# Patient Record
Sex: FEMALE | Race: WHITE | NOT HISPANIC OR LATINO | Employment: FULL TIME | URBAN - METROPOLITAN AREA
[De-identification: names, ages, dates, MRNs, and addresses within clinical notes are randomized per-mention and may not be internally consistent; named-entity substitution may affect disease eponyms.]

---

## 2019-07-10 ENCOUNTER — HOSPITAL ENCOUNTER (EMERGENCY)
Facility: HOSPITAL | Age: 44
Discharge: HOME | End: 2019-07-10
Attending: EMERGENCY MEDICINE

## 2019-07-10 VITALS
BODY MASS INDEX: 31.01 KG/M2 | WEIGHT: 175 LBS | HEART RATE: 73 BPM | HEIGHT: 63 IN | OXYGEN SATURATION: 99 % | TEMPERATURE: 98 F | RESPIRATION RATE: 14 BRPM

## 2019-07-10 DIAGNOSIS — M25.461 KNEE EFFUSION, RIGHT: Primary | ICD-10-CM

## 2019-07-10 DIAGNOSIS — R52 PAIN: ICD-10-CM

## 2019-07-10 PROCEDURE — 25000003 PHARM REV CODE 250: Performed by: NURSE PRACTITIONER

## 2019-07-10 PROCEDURE — 99284 EMERGENCY DEPT VISIT MOD MDM: CPT | Mod: 25

## 2019-07-10 PROCEDURE — 29505 APPLICATION LONG LEG SPLINT: CPT | Mod: RT

## 2019-07-10 RX ORDER — SULFAMETHOXAZOLE AND TRIMETHOPRIM 800; 160 MG/1; MG/1
1 TABLET ORAL
Status: COMPLETED | OUTPATIENT
Start: 2019-07-10 | End: 2019-07-10

## 2019-07-10 RX ORDER — LEVOTHYROXINE SODIUM 75 UG/1
75 TABLET ORAL DAILY
COMMUNITY
End: 2021-10-07

## 2019-07-10 RX ORDER — RIFAMPIN 300 MG/1
300 CAPSULE ORAL EVERY 12 HOURS
Qty: 14 CAPSULE | Refills: 0 | Status: SHIPPED | OUTPATIENT
Start: 2019-07-10 | End: 2019-07-17

## 2019-07-10 RX ORDER — SULFAMETHOXAZOLE AND TRIMETHOPRIM 800; 160 MG/1; MG/1
1 TABLET ORAL 2 TIMES DAILY
Qty: 14 TABLET | Refills: 0 | Status: SHIPPED | OUTPATIENT
Start: 2019-07-10 | End: 2019-07-17

## 2019-07-10 RX ORDER — NAPROXEN 500 MG/1
500 TABLET ORAL 2 TIMES DAILY WITH MEALS
Qty: 60 TABLET | Refills: 0 | Status: SHIPPED | OUTPATIENT
Start: 2019-07-10 | End: 2021-10-07

## 2019-07-10 RX ORDER — RIFAMPIN 300 MG/1
300 CAPSULE ORAL
Status: COMPLETED | OUTPATIENT
Start: 2019-07-10 | End: 2019-07-10

## 2019-07-10 RX ADMIN — SULFAMETHOXAZOLE AND TRIMETHOPRIM 1 TABLET: 800; 160 TABLET ORAL at 05:07

## 2019-07-10 RX ADMIN — RIFAMPIN 300 MG: 300 CAPSULE ORAL at 05:07

## 2019-07-10 NOTE — ED PROVIDER NOTES
Encounter Date: 7/10/2019    SCRIBE #1 NOTE: Mariann COCHRAN, am scribing for, and in the presence of, BHAVIK Fields.       History     Chief Complaint   Patient presents with    Knee Pain     R knee pain since  with edema, painful weight bearing.       Time seen by provider: 3:18 PM on 07/10/2019    Marcelina Deluca is a 43 y.o. female who presents the ED with complaints of right knee pain since x3 days ago. The patient reports swimming in [place] x3 days ago and assumed that the redness and pain was from a sunburn. She reports the pain has been worsening and she has been able to bear weight with pain. She denies any recent injury to that knee. She denies having a fever. The patient denies onset of any other symptoms at this time. PMHx of thyroid disease. SHx includes cholecystectomy and  section. NKDA noted.    The history is provided by the patient.     Review of patient's allergies indicates:  No Known Allergies  Past Medical History:   Diagnosis Date    Thyroid disease      History reviewed. No pertinent surgical history.  History reviewed. No pertinent family history.  Social History     Tobacco Use    Smoking status: Current Every Day Smoker     Types: Cigarettes   Substance Use Topics    Alcohol use: Not on file    Drug use: Not on file     Review of Systems   Constitutional: Negative for activity change, appetite change, chills, fatigue and fever.   Eyes: Negative for visual disturbance.   Respiratory: Negative for apnea and shortness of breath.    Cardiovascular: Negative for chest pain and palpitations.   Gastrointestinal: Negative for abdominal distention and abdominal pain.   Genitourinary: Negative for difficulty urinating.   Musculoskeletal: Positive for arthralgias (right knee). Negative for neck pain.   Skin: Negative for pallor and rash.   Neurological: Negative for headaches.   Hematological: Does not bruise/bleed easily.   Psychiatric/Behavioral: Negative for agitation.        Physical Exam     Initial Vitals [07/10/19 1504]   BP Pulse Resp Temp SpO2   -- 73 14 98.4 °F (36.9 °C) 99 %      MAP       --         Physical Exam    Nursing note and vitals reviewed.  Constitutional: Vital signs are normal. She appears well-developed and well-nourished.   HENT:   Head: Normocephalic and atraumatic.   Eyes: Pupils are equal, round, and reactive to light.   Neck: Neck supple.   Cardiovascular: Normal rate, regular rhythm, normal heart sounds and intact distal pulses. Exam reveals no gallop and no friction rub.    No murmur heard.  Pulses:       Dorsalis pedis pulses are 2+ on the right side.   2+ pedal pulse to lower right extremity.   Pulmonary/Chest: Breath sounds normal. She has no wheezes. She has no rhonchi. She has no rales.   Abdominal: Soft. Normal appearance and bowel sounds are normal. There is no tenderness.   Musculoskeletal:        Right knee: She exhibits swelling. She exhibits normal range of motion, no effusion, no ecchymosis, no deformity, no laceration, no erythema, normal alignment, no LCL laxity, normal patellar mobility and no bony tenderness. Tenderness found.        Legs:  Swelling and tenderness medially and superior to right knee.  Normal ROM of the right knee.  No patella tenderness. No palpable mass or effusion. No overlying erythema or warmth.    Neurological: She is alert and oriented to person, place, and time. She has normal strength.   Skin: Skin is warm, dry and intact.   Psychiatric: She has a normal mood and affect. Her speech is normal and behavior is normal.         ED Course   Procedures  Labs Reviewed - No data to display       Imaging Results          X-Ray Knee 3 View Right (Final result)  Result time 07/10/19 15:41:49    Final result by Debbie Forte MD (07/10/19 15:41:49)                 Impression:      As above      Electronically signed by: Debbie Forte MD  Date:    07/10/2019  Time:    15:41             Narrative:    EXAMINATION:  XR KNEE  3 VIEW RIGHT    TECHNIQUE:  AP, lateral, and Merchant views of the right knee were performed.    COMPARISON:  None    FINDINGS:  No acute fracture or dislocation right knee.  Medial compartment however appears slightly widened and recommend correlation clinically if there is clinical consideration for ligamentous injury..  No joint effusion.                                 Medical Decision Making:   History:   Old Medical Records: I decided to obtain old medical records.  Differential Diagnosis:   Knee strain  Bursitis  Abscess    Clinical Tests:   Radiological Study: Reviewed and Ordered       APC / Resident Notes:   Patient is a 43 y.o. female who presents to the ED 07/10/2019 who underwent emergent evaluation for right knee pain for a few days he has.  Patient denies any injury or trauma.  She is able to bear weight on the right knee.  She has no pain out of proportion.  She has normal range of motion of the right knee.  I do not think septic joint.  There is no overlying warmth or erythema or signs of infection.  Patient is afebrile with normal vital signs and well-appearing in the emergency department.  She has +2 distal pulses to the right lower extremity with no signs of neurovascular compromise.  She has tenderness medially and superior to the right knee.  X-ray of right knee without acute findings.  Do not think acute fracture location.  I have low suspicion for acute ligament or tendon injury. There is no increased laxity of the joint.  There is no palpable mass or abscess or induration.  Ultrasound shows small fluid collection is unclear whether this is an effusion or if this is something forming lateral to the joint.  Discussed these findings with patient and possibility of joint effusion an versus proximal abscess forming.  Patient is placed on antibiotics out of an abundance of precaution.  Discussed possible MRI to further evaluate however I do not believe this is emergent given how well-appearing  patient is and physical exam.  Patient is agreeable to deferment as she is unsure of her current insurance status and would prefer to follow up outpatient in a few days.  I believe this is reasonable and she is given referral to the orthopedic.  She is instructed to return to emergency department for symptoms. She verbalized understanding.  Patient is also given a knee immobilizer and crutches as well as anti-inflammatories for her pain. Based on my clinical evaluation, I do not appreciate any immediate, emergent, or life threatening condition or etiology that warrants additional workup today and feel that the patient can be discharged with close follow up care. Case discussed with Dr. Finch who also evaluated patient and who is agreeable to plan of care. Follow up and return precautions discussed; patient verbalized understanding and is agreeable to plan of care. Patient discharged home in stable condition.             Scribe Attestation:   Scribe #1: I performed the above scribed service and the documentation accurately describes the services I performed. I attest to the accuracy of the note.    Attending Attestation:           Physician Attestation for Scribe:  Physician Attestation Statement for Scribe #1: I, Samantha Davison, reviewed documentation, as scribed by in my presence, and it is both accurate and complete.     Comments: I, BHAVIK Fields, personally performed the services described in this documentation. All medical record entries made by the scribe were at my direction and in my presence.  I have reviewed the chart and agree that the record reflects my personal performance and is accurate and complete. BHAVIK Fields.  5:07 PM 07/10/2019 e              Patient is a 43-year-old female with complaint of right knee pain present for the last 3 days.  Patient was recently out of town and has a sunburn to both legs.  Patient denies any history of injury. Patient has increased pain with  weight-bearing.  No fevers.  Patient is a very benign exam stable vital signs.  The knee has full range of motion. No laxity deformity.  Patient is neurovascularly intact.  There is no cellulitis or abscess appreciated.  No soft tissue swelling. X-rays of the right knee show no acute fracture.  We will obtain an ultrasound will reassess.  Patient's x-rays are fairly unremarkable.  Ultrasound shows a small fluid collection on the medial aspect of the knee unsure if this is connected to the joint space.  Patient appear toxic or septic.  No findings to suggest a septic joint.  Patient placed in an knee immobilizer on anti-inflammatories as well as antibiotics with close follow-up with Orthopedics.  Clinical Impression:       ICD-10-CM ICD-9-CM   1. Knee effusion, right M25.461 719.06   2. Pain R52 780.96         Disposition:   Disposition: Discharged  Condition: Stable                        Samantha Davison NP  07/10/19 1701       Edmund Rahman MD  07/10/19 3835

## 2021-09-22 ENCOUNTER — TELEPHONE (OUTPATIENT)
Dept: DERMATOLOGY | Facility: CLINIC | Age: 46
End: 2021-09-22

## 2021-10-07 ENCOUNTER — OFFICE VISIT (OUTPATIENT)
Dept: DERMATOLOGY | Facility: CLINIC | Age: 46
End: 2021-10-07
Payer: COMMERCIAL

## 2021-10-07 ENCOUNTER — TELEPHONE (OUTPATIENT)
Dept: DERMATOLOGY | Facility: CLINIC | Age: 46
End: 2021-10-07

## 2021-10-07 DIAGNOSIS — L23.9 ALLERGIC CONTACT DERMATITIS, UNSPECIFIED TRIGGER: Primary | ICD-10-CM

## 2021-10-07 DIAGNOSIS — H02.60 XANTHELASMA: ICD-10-CM

## 2021-10-07 DIAGNOSIS — L81.4 SOLAR LENTIGO: ICD-10-CM

## 2021-10-07 DIAGNOSIS — L82.1 SEBORRHEIC KERATOSES: ICD-10-CM

## 2021-10-07 DIAGNOSIS — D22.9 MULTIPLE BENIGN NEVI: ICD-10-CM

## 2021-10-07 PROCEDURE — 1160F PR REVIEW ALL MEDS BY PRESCRIBER/CLIN PHARMACIST DOCUMENTED: ICD-10-PCS | Mod: CPTII,S$GLB,, | Performed by: DERMATOLOGY

## 2021-10-07 PROCEDURE — 1159F PR MEDICATION LIST DOCUMENTED IN MEDICAL RECORD: ICD-10-PCS | Mod: CPTII,S$GLB,, | Performed by: DERMATOLOGY

## 2021-10-07 PROCEDURE — 1159F MED LIST DOCD IN RCRD: CPT | Mod: CPTII,S$GLB,, | Performed by: DERMATOLOGY

## 2021-10-07 PROCEDURE — 99204 OFFICE O/P NEW MOD 45 MIN: CPT | Mod: S$GLB,,, | Performed by: DERMATOLOGY

## 2021-10-07 PROCEDURE — 99999 PR PBB SHADOW E&M-EST. PATIENT-LVL II: CPT | Mod: PBBFAC,,, | Performed by: DERMATOLOGY

## 2021-10-07 PROCEDURE — 99204 PR OFFICE/OUTPT VISIT, NEW, LEVL IV, 45-59 MIN: ICD-10-PCS | Mod: S$GLB,,, | Performed by: DERMATOLOGY

## 2021-10-07 PROCEDURE — 99999 PR PBB SHADOW E&M-EST. PATIENT-LVL II: ICD-10-PCS | Mod: PBBFAC,,, | Performed by: DERMATOLOGY

## 2021-10-07 PROCEDURE — 1160F RVW MEDS BY RX/DR IN RCRD: CPT | Mod: CPTII,S$GLB,, | Performed by: DERMATOLOGY

## 2021-10-07 RX ORDER — DESOXIMETASONE 0.5 MG/G
CREAM TOPICAL
Qty: 100 G | Refills: 2 | Status: SHIPPED | OUTPATIENT
Start: 2021-10-07

## 2021-10-07 RX ORDER — LEVOTHYROXINE SODIUM 100 UG/1
1 TABLET ORAL EVERY MORNING
COMMUNITY
Start: 2020-11-09

## 2021-10-07 RX ORDER — TRIAMCINOLONE ACETONIDE 40 MG/ML
40 INJECTION, SUSPENSION INTRA-ARTICULAR; INTRAMUSCULAR
Status: SHIPPED | OUTPATIENT
Start: 2021-10-07

## 2021-10-07 RX ORDER — TRIAMCINOLONE ACETONIDE 1 MG/G
CREAM TOPICAL 2 TIMES DAILY
COMMUNITY
Start: 2021-09-08 | End: 2021-10-07

## 2021-10-08 RX ORDER — MOMETASONE FUROATE 1 MG/G
OINTMENT TOPICAL
Qty: 45 G | Refills: 1 | Status: SHIPPED | OUTPATIENT
Start: 2021-10-08

## 2024-06-11 ENCOUNTER — OFFICE VISIT (OUTPATIENT)
Dept: URGENT CARE | Facility: CLINIC | Age: 49
End: 2024-06-11
Payer: COMMERCIAL

## 2024-06-11 VITALS
SYSTOLIC BLOOD PRESSURE: 142 MMHG | DIASTOLIC BLOOD PRESSURE: 98 MMHG | TEMPERATURE: 98 F | BODY MASS INDEX: 31.01 KG/M2 | OXYGEN SATURATION: 98 % | WEIGHT: 175 LBS | HEART RATE: 79 BPM | HEIGHT: 63 IN

## 2024-06-11 DIAGNOSIS — L72.9 INFECTED CYST OF SKIN: Primary | ICD-10-CM

## 2024-06-11 DIAGNOSIS — L08.9 INFECTED CYST OF SKIN: Primary | ICD-10-CM

## 2024-06-11 PROCEDURE — 99204 OFFICE O/P NEW MOD 45 MIN: CPT | Mod: S$GLB,,, | Performed by: NURSE PRACTITIONER

## 2024-06-11 RX ORDER — MUPIROCIN 20 MG/G
OINTMENT TOPICAL 2 TIMES DAILY
Qty: 22 G | Refills: 0 | Status: SHIPPED | OUTPATIENT
Start: 2024-06-11

## 2024-06-11 RX ORDER — HYDROCHLOROTHIAZIDE 12.5 MG/1
12.5 CAPSULE ORAL
COMMUNITY

## 2024-06-11 RX ORDER — SULFAMETHOXAZOLE AND TRIMETHOPRIM 800; 160 MG/1; MG/1
1 TABLET ORAL 2 TIMES DAILY
Qty: 14 TABLET | Refills: 0 | Status: SHIPPED | OUTPATIENT
Start: 2024-06-11 | End: 2024-06-18

## 2024-06-11 NOTE — PROCEDURES
"Incision & Drainage    Date/Time: 6/11/2024 5:15 PM    Performed by: Bro Gomez NP  Authorized by: Bro Gomez NP    Time out: Immediately prior to procedure a "time out" was called to verify the correct patient, procedure, equipment, support staff and site/side marked as required.    Consent Done?:  Yes (Verbal)    Type:  Cyst  Body area:  Lower extremity  Complexity:  Simple  Drainage:  Pus  Drainage amount:  Moderate (2 mL)  Wound treatment:  Expression of material (18 gauge needle aspiration)  Patient tolerance:  Patient tolerated the procedure well with no immediate complications    "

## 2024-06-11 NOTE — PROGRESS NOTES
"Subjective:      Patient ID: Marcelina Deluca is a 48 y.o. female.    Vitals:  height is 5' 3" (1.6 m) and weight is 79.4 kg (175 lb). Her oral temperature is 97.8 °F (36.6 °C). Her blood pressure is 142/98 (abnormal) and her pulse is 79. Her oxygen saturation is 98%.     Chief Complaint: Cyst (Behind right knee)    Patient reports history of synovial cyst posterior right knee 1st noted in August of 2023 by ultrasound.  Patient states cyst has become swollen and inflamed in last week.  Complaining of pain and erythema to area.    Cyst  This is a recurrent problem. The current episode started 1 to 4 weeks ago. The problem has been unchanged. Pertinent negatives include no abdominal pain, arthralgias, chest pain, chills, congestion, coughing, fatigue, fever, headaches, joint swelling, myalgias, nausea, neck pain, numbness, rash, sore throat, vertigo, vomiting or weakness. The symptoms are aggravated by standing and walking. She has tried acetaminophen for the symptoms. The treatment provided mild relief.       Constitution: Negative for activity change, appetite change, chills, fatigue, fever, unexpected weight change and generalized weakness.   HENT:  Negative for ear pain, ear discharge, foreign body in ear, tinnitus, hearing loss, dental problem, mouth sores, tongue pain, facial swelling, congestion, postnasal drip, sinus pain, sinus pressure, sore throat, trouble swallowing and voice change.    Neck: Negative for neck pain, neck stiffness and painful lymph nodes.   Cardiovascular:  Negative for chest pain, leg swelling, palpitations and sob on exertion.   Eyes:  Negative for eye trauma, eye discharge, eye itching, eye pain, eye redness, vision loss and eyelid swelling.   Respiratory:  Negative for chest tightness, cough, sputum production, COPD, shortness of breath, wheezing and asthma.    Gastrointestinal:  Negative for abdominal pain, nausea, vomiting, constipation, diarrhea, bright red blood in stool and dark " colored stools.   Endocrine: hair loss, cold intolerance and heat intolerance.   Genitourinary:  Negative for dysuria, frequency, urgency and hematuria.   Musculoskeletal:  Negative for pain, trauma, joint pain, joint swelling, abnormal ROM of joint and muscle ache.   Skin:  Positive for color change, lesion and erythema. Negative for pale, rash, wound and hives.   Allergic/Immunologic: Negative for environmental allergies, seasonal allergies, food allergies, asthma, hives and itching.   Neurological:  Negative for dizziness, history of vertigo, light-headedness, facial drooping, speech difficulty, headaches, disorientation, altered mental status, loss of consciousness and numbness.   Hematologic/Lymphatic: Negative for swollen lymph nodes and easy bruising/bleeding. Does not bruise/bleed easily.   Psychiatric/Behavioral:  Negative for altered mental status, disorientation, confusion, agitation, sleep disturbance and hallucinations.       Objective:     Physical Exam   Constitutional: She is oriented to person, place, and time. She appears well-developed. She is cooperative.   HENT:   Head: Normocephalic and atraumatic.   Ears:   Right Ear: Hearing, tympanic membrane, external ear and ear canal normal.   Left Ear: Hearing, tympanic membrane, external ear and ear canal normal.   Nose: Nose normal. No mucosal edema or nasal deformity. No epistaxis. Right sinus exhibits no maxillary sinus tenderness and no frontal sinus tenderness. Left sinus exhibits no maxillary sinus tenderness and no frontal sinus tenderness.   Mouth/Throat: Uvula is midline, oropharynx is clear and moist and mucous membranes are normal. No trismus in the jaw. Normal dentition. No uvula swelling.   Eyes: Conjunctivae and lids are normal.   Neck: Trachea normal and phonation normal. Neck supple.   Cardiovascular: Normal rate, regular rhythm, normal heart sounds and normal pulses.   Pulmonary/Chest: Effort normal and breath sounds normal.    Abdominal: Normal appearance and bowel sounds are normal. Soft.   Musculoskeletal: Normal range of motion.         General: Normal range of motion.   Neurological: She is alert and oriented to person, place, and time. She exhibits normal muscle tone.   Skin: Skin is warm, dry and intact. erythema        Psychiatric: Her speech is normal and behavior is normal. Judgment and thought content normal.   Nursing note and vitals reviewed.      Assessment:     1. Infected cyst of skin        Plan:       Infected cyst of skin  -     mupirocin (BACTROBAN) 2 % ointment; Apply topically 2 (two) times daily.  Dispense: 22 g; Refill: 0  -     sulfamethoxazole-trimethoprim 800-160mg (BACTRIM DS) 800-160 mg Tab; Take 1 tablet by mouth 2 (two) times daily. for 7 days  Dispense: 14 tablet; Refill: 0  -     Incision & Drainage see procedure note    Utilize over-the-counter Tylenol or Motrin as directed for fever.    Ensure adequate fluid intake with electrolytes.    Return to clinic for new or worsening symptoms.  Patient is recommended to follow-up with their PCP post discharge.    Total time spent on med rec, H&P, with over half of the time in direct patient care: 36 minutes         Additional MDM:     Heart Failure Score:   COPD = No

## 2024-11-20 ENCOUNTER — OFFICE VISIT (OUTPATIENT)
Dept: URGENT CARE | Facility: CLINIC | Age: 49
End: 2024-11-20
Payer: COMMERCIAL

## 2024-11-20 VITALS
DIASTOLIC BLOOD PRESSURE: 86 MMHG | RESPIRATION RATE: 20 BRPM | HEIGHT: 63 IN | BODY MASS INDEX: 35.44 KG/M2 | HEART RATE: 68 BPM | TEMPERATURE: 98 F | SYSTOLIC BLOOD PRESSURE: 134 MMHG | WEIGHT: 200 LBS | OXYGEN SATURATION: 98 %

## 2024-11-20 DIAGNOSIS — J02.9 SORE THROAT: Primary | ICD-10-CM

## 2024-11-20 DIAGNOSIS — J02.9 PHARYNGITIS, UNSPECIFIED ETIOLOGY: ICD-10-CM

## 2024-11-20 LAB
CTP QC/QA: YES
S PYO RRNA THROAT QL PROBE: NEGATIVE

## 2024-11-20 PROCEDURE — 99214 OFFICE O/P EST MOD 30 MIN: CPT | Mod: S$GLB,,, | Performed by: STUDENT IN AN ORGANIZED HEALTH CARE EDUCATION/TRAINING PROGRAM

## 2024-11-20 PROCEDURE — 87880 STREP A ASSAY W/OPTIC: CPT | Mod: QW,,, | Performed by: STUDENT IN AN ORGANIZED HEALTH CARE EDUCATION/TRAINING PROGRAM

## 2024-11-20 RX ORDER — CLINDAMYCIN HYDROCHLORIDE 300 MG/1
300 CAPSULE ORAL 3 TIMES DAILY
COMMUNITY
Start: 2024-06-21

## 2024-11-20 RX ORDER — FLUTICASONE PROPIONATE 50 MCG
1 SPRAY, SUSPENSION (ML) NASAL DAILY
Qty: 11.1 ML | Refills: 0 | Status: SHIPPED | OUTPATIENT
Start: 2024-11-20

## 2024-11-20 RX ORDER — LEVOCETIRIZINE DIHYDROCHLORIDE 5 MG/1
5 TABLET, FILM COATED ORAL NIGHTLY
Qty: 30 TABLET | Refills: 0 | Status: SHIPPED | OUTPATIENT
Start: 2024-11-20 | End: 2025-11-20

## 2024-11-20 NOTE — PROGRESS NOTES
"Subjective:      Patient ID: Marcelina Deluca is a 48 y.o. female.    Vitals:  height is 5' 3" (1.6 m) and weight is 90.7 kg (200 lb). Her temperature is 97.8 °F (36.6 °C). Her blood pressure is 134/86 and her pulse is 68. Her respiration is 20 and oxygen saturation is 98%.     Chief Complaint: Sore Throat     Ambulatory to room with complaint of sore throat began yesterday, denies subjective fevers.    Sore Throat   This is a new problem. The current episode started yesterday. The problem has been unchanged.       Constitution: Negative.   HENT:  Positive for sore throat.    Neck: neck negative.   Cardiovascular: Negative.    Eyes: Negative.    Respiratory: Negative.     Gastrointestinal: Negative.    Genitourinary: Negative.    Musculoskeletal: Negative.    Skin: Negative.    Allergic/Immunologic: Negative.    Neurological: Negative.    Psychiatric/Behavioral: Negative.        Objective:     Physical Exam   Constitutional: She is oriented to person, place, and time. She appears well-developed. She is cooperative.  Non-toxic appearance. She does not appear ill. No distress.   HENT:   Head: Normocephalic and atraumatic.   Ears:   Right Ear: Hearing and external ear normal.   Left Ear: Hearing and external ear normal.   Nose: Nose normal. No mucosal edema, rhinorrhea, nasal deformity or congestion. No epistaxis. Right sinus exhibits no maxillary sinus tenderness and no frontal sinus tenderness. Left sinus exhibits no maxillary sinus tenderness and no frontal sinus tenderness.   Mouth/Throat: Uvula is midline, oropharynx is clear and moist and mucous membranes are normal. Mucous membranes are moist. No trismus in the jaw. Normal dentition. No uvula swelling. No oropharyngeal exudate, posterior oropharyngeal edema or posterior oropharyngeal erythema. Oropharynx is clear.   Eyes: Conjunctivae and lids are normal. No scleral icterus.   Neck: Trachea normal and phonation normal. Neck supple. No edema present. No erythema " present. No neck rigidity present.   Cardiovascular: Normal rate, regular rhythm, normal heart sounds and normal pulses.   Pulmonary/Chest: Effort normal and breath sounds normal. No stridor. No respiratory distress. She has no decreased breath sounds. She has no wheezes. She has no rhonchi. She has no rales.   Abdominal: Normal appearance.   Musculoskeletal: Normal range of motion.         General: No deformity. Normal range of motion.   Neurological: She is alert and oriented to person, place, and time. She exhibits normal muscle tone. Coordination normal.   Skin: Skin is warm, dry, intact, not diaphoretic and not pale.   Psychiatric: Her speech is normal and behavior is normal. Judgment and thought content normal.   Nursing note and vitals reviewed.      Assessment:     1. Sore throat    2. Pharyngitis, unspecified etiology        Plan:       Sore throat  -     POCT rapid strep A    Pharyngitis, unspecified etiology    Other orders  -     fluticasone propionate (FLONASE) 50 mcg/actuation nasal spray; 1 spray (50 mcg total) by Each Nostril route once daily.  Dispense: 11.1 mL; Refill: 0  -     levocetirizine (XYZAL) 5 MG tablet; Take 1 tablet (5 mg total) by mouth every evening.  Dispense: 30 tablet; Refill: 0        Strep negative   Antihistamines, inhaled steroid, antitussives as needed.  Tylenol and ibuprofen as needed for pain and body aches.  Instructions given for when to return to the clinic or present to the ED.    - To ED for any new or acutely worsening symptoms including but not limited to chest pain, palpitations, shortness of breath, or fever greater than 103° F.  Patient in agreement with plan of care.    - The diagnosis, treatment plan, instructions for follow-up and reevaluation as well as ED precautions were discussed and understanding was verbalized. All questions or concerns have been addressed.  -Follow up with your primary care provider for continued evaluation and management.

## 2024-12-06 ENCOUNTER — OFFICE VISIT (OUTPATIENT)
Dept: UROLOGY | Facility: CLINIC | Age: 49
End: 2024-12-06
Payer: COMMERCIAL

## 2024-12-06 VITALS — WEIGHT: 199.94 LBS | BODY MASS INDEX: 35.43 KG/M2 | HEIGHT: 63 IN

## 2024-12-06 DIAGNOSIS — N39.46 MIXED URINARY INCONTINENCE DUE TO FEMALE GENITAL PROLAPSE: ICD-10-CM

## 2024-12-06 DIAGNOSIS — N32.81 OVERACTIVE BLADDER: Primary | ICD-10-CM

## 2024-12-06 DIAGNOSIS — N81.9 MIXED URINARY INCONTINENCE DUE TO FEMALE GENITAL PROLAPSE: ICD-10-CM

## 2024-12-06 LAB
BACTERIA #/AREA URNS AUTO: NORMAL /HPF
BILIRUBIN, UA POC OHS: NEGATIVE
BLOOD, UA POC OHS: ABNORMAL
CLARITY, UA POC OHS: CLEAR
COLOR, UA POC OHS: YELLOW
GLUCOSE, UA POC OHS: NEGATIVE
KETONES, UA POC OHS: NEGATIVE
LEUKOCYTES, UA POC OHS: ABNORMAL
MICROSCOPIC COMMENT: NORMAL
NITRITE, UA POC OHS: NEGATIVE
PH, UA POC OHS: 5.5
POC RESIDUAL URINE VOLUME: 0 ML (ref 0–100)
PROTEIN, UA POC OHS: NEGATIVE
RBC #/AREA URNS AUTO: 1 /HPF (ref 0–4)
SPECIFIC GRAVITY, UA POC OHS: <=1.005
SQUAMOUS #/AREA URNS AUTO: 1 /HPF
UROBILINOGEN, UA POC OHS: 0.2
WBC #/AREA URNS AUTO: 1 /HPF (ref 0–5)

## 2024-12-06 PROCEDURE — 81001 URINALYSIS AUTO W/SCOPE: CPT | Performed by: NURSE PRACTITIONER

## 2024-12-06 PROCEDURE — 99999 PR PBB SHADOW E&M-EST. PATIENT-LVL IV: CPT | Mod: PBBFAC,,, | Performed by: NURSE PRACTITIONER

## 2024-12-06 PROCEDURE — 87086 URINE CULTURE/COLONY COUNT: CPT | Performed by: NURSE PRACTITIONER

## 2024-12-06 RX ORDER — SOLIFENACIN SUCCINATE 10 MG/1
10 TABLET, FILM COATED ORAL DAILY
Qty: 30 TABLET | Refills: 11 | Status: SHIPPED | OUTPATIENT
Start: 2024-12-06 | End: 2025-12-06

## 2024-12-06 NOTE — PROGRESS NOTES
"[facility]  Group: Yana/Ruba/Jessica/Cristian  NPs: Stephanie Schwartz/Christine Glover    Note today written by:Christine Glover  Date of Service: 12/06/2024      CHIEF COMPLAINT: OAB.    PRESENTING ILLNESS:    Marcelina Deluca is a 49 y.o. female who presents for OAB. This is her initial clinic visit    12/6/24  Pt presents for OAB that has been ongoing for the past 5 years, gradually worsening  Voids every 30 min to 1 hour  + urgency and UUI, + LAURIE   Wearing ~3 pads per day  Has tried OAB medication years ago with no relief. Unsure of names but believes was 4 different medications.  Denies dysuria, gross hematuria, flank pain, fever, chills, nausea or vomiting.  UA sm blood, sm WBC  PVR 0 ml    Denies constipation but was started on iron supplements so could be starting with constipation.    12/4/24 CT abd pelvis wo contrast:  No obstructive urolithiasis or hydronephrosis. No radiodense renal calculi. The urinary bladder is partially collapsed with no acute noncontrast abnormality.   + constipation    Has f/u with GI for gastritis and hernia.    Denies hx of hysterectomy or bladder surgery  Denies prolapse    History of kidney stones: few years ago was told she possibly passed a kidney stones but all imaging since 2021 has been negative for stones  History of recurrent UTI: denies  Personal or family hx of  malignancy: denies  History of  trauma: denies  Smoking history: everyday smoker    Urine cultures:   No results found for: "LABURIN"      REVIEW OF SYSTEMS: as stated above in hpi    PATIENT HISTORY:  Past Medical History:   Diagnosis Date    Thyroid disease        No past surgical history on file.    Allergies:  Patient has no known allergies.      PHYSICAL EXAMINATION:  Constitutional: She is oriented to person, place, and time. She appears well-developed and well-nourished.  She is in no apparent distress.  Abdominal:  She exhibits no distension.  " "There is no CVA tenderness.   Neurological: She is alert and oriented to person, place, and time.   Psych: Cooperative with normal affect.        Physical Exam      LABS:      No results found for: "CREATININE"  No results found for: "LABA1C", "HGBA1C"      IMPRESSION:    Encounter Diagnoses   Name Primary?    Overactive bladder Yes    Mixed urinary incontinence due to female genital prolapse        PLAN:  -Urine sent for micro UA and culture. Will treat based on results    -Discussed OAB and treatment options  Pt would like to try OAB medication. Will start with vesicare  Discussed side effects and indications for vesicare. Prescription sent to the pharmacy. Pt verbalized understanding.  If no improvement at follow up, plan for myrbetriq and gemtesa    -Conservative measures to control urgency and frequency including   1. Avoiding/minimizing bladder irritants (see below), especially in afternoon and evening hours  Discussed bladder irritants include coffee (even decaf), tea, alcohol, soda, spicy foods, acidic juices (orange, tomato), vinegar, and artificial sweeteners/sugary beverages.  2. timed voiding - empty on a schedule (approx ~2-3 hours) in spite of need to urinate, to get ahead of urge  3. dont postponing voiding - dont hold it on purpose   4. bowel regimen as distended bowel has extrinsic compressive effect on bladder.   - any or all of the following in any combination, titrate to soft daily bowel movement without pushing or straining  - colace/stool softener capsule - once to twice daily  - miralax - 1 capful daily to start, can increase to 2x daily (or decrease to 1/2 cap daily)  - increase dietary fibery  - fiber supplements, such as metamucil  - prunes, prune juice  5. INCREASE water intake  6. Stop fluids 2 hours before bed, and urinate just before bed    -Discussed kegels for stress incontinence. Educated her to do 10 sets of 10 daily- squeeze for 10 seconds and rest for 10 seconds. Also, gave " handout on kegels.   Pelvic floor exercises given on AVS  External referral to PT pelvic floor given    -RTC 8 weeks    I encouraged her or any of her family members to call or email me with questions and/or concerns.      45 minutes of total time spent on the encounter, which includes face to face time and non-face to face time preparing to see the patient (eg, review of tests), Obtaining and/or reviewing separately obtained history, Documenting clinical information in the electronic or other health record, Independently interpreting results (not separately reported) and communicating results to the patient/family/caregiver, or Care coordination (not separately reported).

## 2024-12-09 ENCOUNTER — TELEPHONE (OUTPATIENT)
Dept: UROLOGY | Facility: CLINIC | Age: 49
End: 2024-12-09
Payer: COMMERCIAL

## 2024-12-09 DIAGNOSIS — A49.9 BACTERIAL UTI: Primary | ICD-10-CM

## 2024-12-09 DIAGNOSIS — N39.0 BACTERIAL UTI: Primary | ICD-10-CM

## 2024-12-09 RX ORDER — SULFAMETHOXAZOLE AND TRIMETHOPRIM 800; 160 MG/1; MG/1
1 TABLET ORAL 2 TIMES DAILY
Qty: 10 TABLET | Refills: 0 | Status: SHIPPED | OUTPATIENT
Start: 2024-12-09 | End: 2024-12-14

## 2024-12-09 NOTE — TELEPHONE ENCOUNTER
Spoke with pt  + UTI  Sensitive to bactrim  Discussed side effects and indications for bactrim. Prescription sent to the pharmacy. Pt verbalized understanding.  NKA  RTC as scheduled

## 2025-02-05 ENCOUNTER — HOSPITAL ENCOUNTER (INPATIENT)
Facility: HOSPITAL | Age: 50
LOS: 1 days | Discharge: HOME | DRG: 446 | End: 2025-02-07
Attending: STUDENT IN AN ORGANIZED HEALTH CARE EDUCATION/TRAINING PROGRAM | Admitting: INTERNAL MEDICINE
Payer: COMMERCIAL

## 2025-02-05 DIAGNOSIS — R74.01 TRANSAMINITIS: Primary | ICD-10-CM

## 2025-02-05 DIAGNOSIS — K80.50 CHOLEDOCHOLITHIASIS: ICD-10-CM

## 2025-02-05 DIAGNOSIS — R07.9 CHEST PAIN: ICD-10-CM

## 2025-02-05 LAB
ALBUMIN SERPL BCP-MCNC: 4.3 G/DL (ref 3.5–5.2)
ALP SERPL-CCNC: 289 U/L (ref 55–135)
ALT SERPL W/O P-5'-P-CCNC: 247 U/L (ref 10–44)
ANION GAP SERPL CALC-SCNC: 8 MMOL/L (ref 8–16)
AST SERPL-CCNC: 631 U/L (ref 10–40)
BASOPHILS # BLD AUTO: 0.02 K/UL (ref 0–0.2)
BASOPHILS NFR BLD: 0.3 % (ref 0–1.9)
BILIRUB SERPL-MCNC: 1.3 MG/DL (ref 0.1–1)
BUN SERPL-MCNC: 14 MG/DL (ref 6–20)
CALCIUM SERPL-MCNC: 9.3 MG/DL (ref 8.7–10.5)
CHLORIDE SERPL-SCNC: 103 MMOL/L (ref 95–110)
CO2 SERPL-SCNC: 26 MMOL/L (ref 23–29)
CREAT SERPL-MCNC: 0.9 MG/DL (ref 0.5–1.4)
DIFFERENTIAL METHOD BLD: ABNORMAL
EOSINOPHIL # BLD AUTO: 0 K/UL (ref 0–0.5)
EOSINOPHIL NFR BLD: 0.2 % (ref 0–8)
ERYTHROCYTE [DISTWIDTH] IN BLOOD BY AUTOMATED COUNT: 12.6 % (ref 11.5–14.5)
EST. GFR  (NO RACE VARIABLE): >60 ML/MIN/1.73 M^2
GLUCOSE SERPL-MCNC: 131 MG/DL (ref 70–110)
HCT VFR BLD AUTO: 41.9 % (ref 37–48.5)
HGB BLD-MCNC: 13.9 G/DL (ref 12–16)
IMM GRANULOCYTES # BLD AUTO: 0.02 K/UL (ref 0–0.04)
IMM GRANULOCYTES NFR BLD AUTO: 0.3 % (ref 0–0.5)
LIPASE SERPL-CCNC: 7 U/L (ref 4–60)
LYMPHOCYTES # BLD AUTO: 0.7 K/UL (ref 1–4.8)
LYMPHOCYTES NFR BLD: 11.4 % (ref 18–48)
MCH RBC QN AUTO: 29 PG (ref 27–31)
MCHC RBC AUTO-ENTMCNC: 33.2 G/DL (ref 32–36)
MCV RBC AUTO: 88 FL (ref 82–98)
MONOCYTES # BLD AUTO: 0.3 K/UL (ref 0.3–1)
MONOCYTES NFR BLD: 5.3 % (ref 4–15)
NEUTROPHILS # BLD AUTO: 4.8 K/UL (ref 1.8–7.7)
NEUTROPHILS NFR BLD: 82.5 % (ref 38–73)
NRBC BLD-RTO: 0 /100 WBC
PLATELET # BLD AUTO: 232 K/UL (ref 150–450)
PMV BLD AUTO: 10.5 FL (ref 9.2–12.9)
POTASSIUM SERPL-SCNC: 3.6 MMOL/L (ref 3.5–5.1)
PROT SERPL-MCNC: 7.2 G/DL (ref 6–8.4)
RBC # BLD AUTO: 4.79 M/UL (ref 4–5.4)
SODIUM SERPL-SCNC: 137 MMOL/L (ref 136–145)
TROPONIN I SERPL HS-MCNC: 5.4 PG/ML (ref 0–14.9)
WBC # BLD AUTO: 5.8 K/UL (ref 3.9–12.7)

## 2025-02-05 PROCEDURE — 80053 COMPREHEN METABOLIC PANEL: CPT | Performed by: PHYSICIAN ASSISTANT

## 2025-02-05 PROCEDURE — 63600175 PHARM REV CODE 636 W HCPCS: Mod: TB,JZ | Performed by: STUDENT IN AN ORGANIZED HEALTH CARE EDUCATION/TRAINING PROGRAM

## 2025-02-05 PROCEDURE — 84484 ASSAY OF TROPONIN QUANT: CPT | Performed by: PHYSICIAN ASSISTANT

## 2025-02-05 PROCEDURE — 96375 TX/PRO/DX INJ NEW DRUG ADDON: CPT

## 2025-02-05 PROCEDURE — 83690 ASSAY OF LIPASE: CPT | Performed by: PHYSICIAN ASSISTANT

## 2025-02-05 PROCEDURE — 85025 COMPLETE CBC W/AUTO DIFF WBC: CPT | Performed by: PHYSICIAN ASSISTANT

## 2025-02-05 PROCEDURE — 99285 EMERGENCY DEPT VISIT HI MDM: CPT | Mod: 25

## 2025-02-05 PROCEDURE — 81025 URINE PREGNANCY TEST: CPT | Performed by: STUDENT IN AN ORGANIZED HEALTH CARE EDUCATION/TRAINING PROGRAM

## 2025-02-05 PROCEDURE — 25000003 PHARM REV CODE 250: Performed by: STUDENT IN AN ORGANIZED HEALTH CARE EDUCATION/TRAINING PROGRAM

## 2025-02-05 RX ORDER — LIDOCAINE 50 MG/G
1 PATCH TOPICAL
Status: DISCONTINUED | OUTPATIENT
Start: 2025-02-05 | End: 2025-02-07 | Stop reason: HOSPADM

## 2025-02-05 RX ORDER — ONDANSETRON 4 MG/1
4 TABLET, ORALLY DISINTEGRATING ORAL
Status: COMPLETED | OUTPATIENT
Start: 2025-02-05 | End: 2025-02-05

## 2025-02-05 RX ORDER — KETOROLAC TROMETHAMINE 30 MG/ML
15 INJECTION, SOLUTION INTRAMUSCULAR; INTRAVENOUS
Status: COMPLETED | OUTPATIENT
Start: 2025-02-05 | End: 2025-02-05

## 2025-02-05 RX ADMIN — KETOROLAC TROMETHAMINE 15 MG: 30 INJECTION, SOLUTION INTRAMUSCULAR; INTRAVENOUS at 11:02

## 2025-02-05 RX ADMIN — ONDANSETRON 4 MG: 4 TABLET, ORALLY DISINTEGRATING ORAL at 11:02

## 2025-02-05 NOTE — LETTER
February 7, 2025         [address]  [facility] [address]  Phone: [phone]  Fax: [phone]       Patient: Marcelina Deluca   YOB: 1975  Date of Visit: 02/05/2025 - 02/07/2025    To Whom It May Concern:    Juanis Deluca  was at [facility] on 02/05/2025 - 02/07/2025. The patient may return to work/school on 02/08/2025 with no restrictions. If you have any questions or concerns, or if I can be of further assistance, please do not hesitate to contact me.    Sincerely,    Irineo Castillo RN

## 2025-02-06 ENCOUNTER — ANESTHESIA (OUTPATIENT)
Dept: SURGERY | Facility: HOSPITAL | Age: 50
DRG: 446 | End: 2025-02-06
Payer: COMMERCIAL

## 2025-02-06 ENCOUNTER — ANESTHESIA EVENT (OUTPATIENT)
Dept: SURGERY | Facility: HOSPITAL | Age: 50
DRG: 446 | End: 2025-02-06
Payer: COMMERCIAL

## 2025-02-06 PROBLEM — E89.0 POSTABLATIVE HYPOTHYROIDISM: Status: ACTIVE | Noted: 2025-02-06

## 2025-02-06 PROBLEM — E05.00 GRAVES DISEASE: Status: ACTIVE | Noted: 2025-02-06

## 2025-02-06 PROBLEM — K80.50 CHOLEDOCHOLITHIASIS: Status: ACTIVE | Noted: 2025-02-06

## 2025-02-06 PROBLEM — I10 PRIMARY HYPERTENSION: Status: ACTIVE | Noted: 2025-02-06

## 2025-02-06 PROBLEM — R74.01 TRANSAMINITIS: Status: ACTIVE | Noted: 2025-02-06

## 2025-02-06 LAB
ALBUMIN SERPL BCP-MCNC: 4 G/DL (ref 3.5–5.2)
ALP SERPL-CCNC: 239 U/L (ref 55–135)
ALT SERPL W/O P-5'-P-CCNC: 194 U/L (ref 10–44)
ANION GAP SERPL CALC-SCNC: 8 MMOL/L (ref 8–16)
APAP SERPL-MCNC: 1.3 UG/ML (ref 10–20)
AST SERPL-CCNC: 340 U/L (ref 10–40)
B-HCG UR QL: NEGATIVE
BASOPHILS # BLD AUTO: 0.02 K/UL (ref 0–0.2)
BASOPHILS NFR BLD: 0.4 % (ref 0–1.9)
BILIRUB SERPL-MCNC: 0.8 MG/DL (ref 0.1–1)
BUN SERPL-MCNC: 14 MG/DL (ref 6–20)
CALCIUM SERPL-MCNC: 8.7 MG/DL (ref 8.7–10.5)
CHLORIDE SERPL-SCNC: 104 MMOL/L (ref 95–110)
CO2 SERPL-SCNC: 24 MMOL/L (ref 23–29)
CREAT SERPL-MCNC: 0.8 MG/DL (ref 0.5–1.4)
CTP QC/QA: YES
DIFFERENTIAL METHOD BLD: NORMAL
EOSINOPHIL # BLD AUTO: 0.1 K/UL (ref 0–0.5)
EOSINOPHIL NFR BLD: 0.9 % (ref 0–8)
ERYTHROCYTE [DISTWIDTH] IN BLOOD BY AUTOMATED COUNT: 12.6 % (ref 11.5–14.5)
EST. GFR  (NO RACE VARIABLE): >60 ML/MIN/1.73 M^2
GLUCOSE SERPL-MCNC: 92 MG/DL (ref 70–110)
HCT VFR BLD AUTO: 37.6 % (ref 37–48.5)
HGB BLD-MCNC: 12.6 G/DL (ref 12–16)
IMM GRANULOCYTES # BLD AUTO: 0.01 K/UL (ref 0–0.04)
IMM GRANULOCYTES NFR BLD AUTO: 0.2 % (ref 0–0.5)
INR PPP: 1 (ref 0.8–1.2)
LYMPHOCYTES # BLD AUTO: 1.2 K/UL (ref 1–4.8)
LYMPHOCYTES NFR BLD: 21.8 % (ref 18–48)
MAGNESIUM SERPL-MCNC: 2.2 MG/DL (ref 1.6–2.6)
MCH RBC QN AUTO: 29.2 PG (ref 27–31)
MCHC RBC AUTO-ENTMCNC: 33.5 G/DL (ref 32–36)
MCV RBC AUTO: 87 FL (ref 82–98)
MONOCYTES # BLD AUTO: 0.4 K/UL (ref 0.3–1)
MONOCYTES NFR BLD: 7.1 % (ref 4–15)
NEUTROPHILS # BLD AUTO: 3.7 K/UL (ref 1.8–7.7)
NEUTROPHILS NFR BLD: 69.6 % (ref 38–73)
NRBC BLD-RTO: 0 /100 WBC
PLATELET # BLD AUTO: 215 K/UL (ref 150–450)
PMV BLD AUTO: 10.7 FL (ref 9.2–12.9)
POTASSIUM SERPL-SCNC: 3.4 MMOL/L (ref 3.5–5.1)
PROT SERPL-MCNC: 6.6 G/DL (ref 6–8.4)
PROTHROMBIN TIME: 11.1 SEC (ref 9–12.5)
RBC # BLD AUTO: 4.31 M/UL (ref 4–5.4)
SODIUM SERPL-SCNC: 136 MMOL/L (ref 136–145)
T4 FREE SERPL-MCNC: 0.93 NG/DL (ref 0.71–1.51)
TSH SERPL DL<=0.005 MIU/L-ACNC: 6.96 UIU/ML (ref 0.34–5.6)
WBC # BLD AUTO: 5.33 K/UL (ref 3.9–12.7)

## 2025-02-06 PROCEDURE — 27000673 HC TUBING BLOOD Y: Performed by: ANESTHESIOLOGY

## 2025-02-06 PROCEDURE — 43262 ENDO CHOLANGIOPANCREATOGRAPH: CPT | Performed by: INTERNAL MEDICINE

## 2025-02-06 PROCEDURE — 25000003 PHARM REV CODE 250: Performed by: INTERNAL MEDICINE

## 2025-02-06 PROCEDURE — 74328 X-RAY BILE DUCT ENDOSCOPY: CPT | Mod: TC | Performed by: INTERNAL MEDICINE

## 2025-02-06 PROCEDURE — 80143 DRUG ASSAY ACETAMINOPHEN: CPT | Performed by: INTERNAL MEDICINE

## 2025-02-06 PROCEDURE — 84439 ASSAY OF FREE THYROXINE: CPT | Performed by: INTERNAL MEDICINE

## 2025-02-06 PROCEDURE — 27202107 HC XP QUATRO SENSOR: Performed by: ANESTHESIOLOGY

## 2025-02-06 PROCEDURE — 27202125 HC BALLOON, EXTRACTION (ANY): Performed by: INTERNAL MEDICINE

## 2025-02-06 PROCEDURE — 25500020 PHARM REV CODE 255: Performed by: INTERNAL MEDICINE

## 2025-02-06 PROCEDURE — 83735 ASSAY OF MAGNESIUM: CPT | Performed by: INTERNAL MEDICINE

## 2025-02-06 PROCEDURE — 25000003 PHARM REV CODE 250: Performed by: STUDENT IN AN ORGANIZED HEALTH CARE EDUCATION/TRAINING PROGRAM

## 2025-02-06 PROCEDURE — 63600175 PHARM REV CODE 636 W HCPCS: Performed by: NURSE ANESTHETIST, CERTIFIED REGISTERED

## 2025-02-06 PROCEDURE — 80074 ACUTE HEPATITIS PANEL: CPT | Performed by: STUDENT IN AN ORGANIZED HEALTH CARE EDUCATION/TRAINING PROGRAM

## 2025-02-06 PROCEDURE — 96366 THER/PROPH/DIAG IV INF ADDON: CPT

## 2025-02-06 PROCEDURE — 85025 COMPLETE CBC W/AUTO DIFF WBC: CPT | Performed by: INTERNAL MEDICINE

## 2025-02-06 PROCEDURE — 80053 COMPREHEN METABOLIC PANEL: CPT | Performed by: INTERNAL MEDICINE

## 2025-02-06 PROCEDURE — 85610 PROTHROMBIN TIME: CPT | Performed by: STUDENT IN AN ORGANIZED HEALTH CARE EDUCATION/TRAINING PROGRAM

## 2025-02-06 PROCEDURE — 63600175 PHARM REV CODE 636 W HCPCS: Performed by: INTERNAL MEDICINE

## 2025-02-06 PROCEDURE — 25000003 PHARM REV CODE 250: Performed by: ANESTHESIOLOGY

## 2025-02-06 PROCEDURE — 96365 THER/PROPH/DIAG IV INF INIT: CPT

## 2025-02-06 PROCEDURE — D9220A PRA ANESTHESIA: Mod: ANES,,, | Performed by: ANESTHESIOLOGY

## 2025-02-06 PROCEDURE — 27201107 HC STYLET, STANDARD: Performed by: ANESTHESIOLOGY

## 2025-02-06 PROCEDURE — 43264 ERCP REMOVE DUCT CALCULI: CPT | Performed by: INTERNAL MEDICINE

## 2025-02-06 PROCEDURE — 0FJB8ZZ INSPECTION OF HEPATOBILIARY DUCT, VIA NATURAL OR ARTIFICIAL OPENING ENDOSCOPIC: ICD-10-PCS | Performed by: INTERNAL MEDICINE

## 2025-02-06 PROCEDURE — D9220A PRA ANESTHESIA: Mod: CRNA,,, | Performed by: NURSE ANESTHETIST, CERTIFIED REGISTERED

## 2025-02-06 PROCEDURE — C1769 GUIDE WIRE: HCPCS | Performed by: INTERNAL MEDICINE

## 2025-02-06 PROCEDURE — 37000009 HC ANESTHESIA EA ADD 15 MINS: Performed by: INTERNAL MEDICINE

## 2025-02-06 PROCEDURE — 25000003 PHARM REV CODE 250: Performed by: NURSE ANESTHETIST, CERTIFIED REGISTERED

## 2025-02-06 PROCEDURE — 37000008 HC ANESTHESIA 1ST 15 MINUTES: Performed by: INTERNAL MEDICINE

## 2025-02-06 PROCEDURE — 12000002 HC ACUTE/MED SURGE SEMI-PRIVATE ROOM

## 2025-02-06 PROCEDURE — 84443 ASSAY THYROID STIM HORMONE: CPT | Performed by: INTERNAL MEDICINE

## 2025-02-06 PROCEDURE — 99406 BEHAV CHNG SMOKING 3-10 MIN: CPT | Performed by: CLINIC/CENTER

## 2025-02-06 PROCEDURE — 36415 COLL VENOUS BLD VENIPUNCTURE: CPT | Performed by: INTERNAL MEDICINE

## 2025-02-06 RX ORDER — FAMOTIDINE 10 MG/ML
INJECTION INTRAVENOUS
Status: DISCONTINUED | OUTPATIENT
Start: 2025-02-06 | End: 2025-02-06

## 2025-02-06 RX ORDER — PANTOPRAZOLE SODIUM 40 MG/1
40 TABLET, DELAYED RELEASE ORAL DAILY
COMMUNITY
End: 2025-02-09

## 2025-02-06 RX ORDER — IBUPROFEN 600 MG/1
600 TABLET ORAL EVERY 6 HOURS PRN
COMMUNITY
End: 2025-02-09

## 2025-02-06 RX ORDER — FENTANYL CITRATE 50 UG/ML
25 INJECTION, SOLUTION INTRAMUSCULAR; INTRAVENOUS EVERY 5 MIN PRN
Status: DISCONTINUED | OUTPATIENT
Start: 2025-02-06 | End: 2025-02-06 | Stop reason: HOSPADM

## 2025-02-06 RX ORDER — NALOXONE HCL 0.4 MG/ML
0.02 VIAL (ML) INJECTION
Status: DISCONTINUED | OUTPATIENT
Start: 2025-02-06 | End: 2025-02-07 | Stop reason: HOSPADM

## 2025-02-06 RX ORDER — LEVOTHYROXINE SODIUM 112 UG/1
112 TABLET ORAL EVERY MORNING
Status: DISCONTINUED | OUTPATIENT
Start: 2025-02-07 | End: 2025-02-06

## 2025-02-06 RX ORDER — SODIUM,POTASSIUM PHOSPHATES 280-250MG
2 POWDER IN PACKET (EA) ORAL
Status: DISCONTINUED | OUTPATIENT
Start: 2025-02-06 | End: 2025-02-07 | Stop reason: HOSPADM

## 2025-02-06 RX ORDER — FAMOTIDINE 20 MG/1
20 TABLET, FILM COATED ORAL 2 TIMES DAILY
Status: DISCONTINUED | OUTPATIENT
Start: 2025-02-06 | End: 2025-02-07 | Stop reason: HOSPADM

## 2025-02-06 RX ORDER — MEPERIDINE HYDROCHLORIDE 50 MG/ML
12.5 INJECTION INTRAMUSCULAR; INTRAVENOUS; SUBCUTANEOUS EVERY 10 MIN PRN
Status: DISCONTINUED | OUTPATIENT
Start: 2025-02-06 | End: 2025-02-06 | Stop reason: HOSPADM

## 2025-02-06 RX ORDER — LEVOTHYROXINE SODIUM 100 UG/1
100 TABLET ORAL EVERY MORNING
Status: DISCONTINUED | OUTPATIENT
Start: 2025-02-06 | End: 2025-02-06

## 2025-02-06 RX ORDER — ENOXAPARIN SODIUM 100 MG/ML
40 INJECTION SUBCUTANEOUS EVERY 24 HOURS
Status: DISCONTINUED | OUTPATIENT
Start: 2025-02-06 | End: 2025-02-07 | Stop reason: HOSPADM

## 2025-02-06 RX ORDER — HYDROCODONE BITARTRATE AND ACETAMINOPHEN 7.5; 325 MG/1; MG/1
1 TABLET ORAL EVERY 6 HOURS PRN
Status: ON HOLD | COMMUNITY
Start: 2025-02-05 | End: 2025-02-06

## 2025-02-06 RX ORDER — ROCURONIUM BROMIDE 10 MG/ML
INJECTION, SOLUTION INTRAVENOUS
Status: DISCONTINUED | OUTPATIENT
Start: 2025-02-06 | End: 2025-02-06

## 2025-02-06 RX ORDER — IBUPROFEN 200 MG
16 TABLET ORAL
Status: DISCONTINUED | OUTPATIENT
Start: 2025-02-06 | End: 2025-02-07 | Stop reason: HOSPADM

## 2025-02-06 RX ORDER — SCOPOLAMINE 1 MG/3D
1 PATCH, EXTENDED RELEASE TRANSDERMAL ONCE
Status: DISCONTINUED | OUTPATIENT
Start: 2025-02-06 | End: 2025-02-07 | Stop reason: HOSPADM

## 2025-02-06 RX ORDER — TALC
6 POWDER (GRAM) TOPICAL NIGHTLY PRN
Status: DISCONTINUED | OUTPATIENT
Start: 2025-02-06 | End: 2025-02-07 | Stop reason: HOSPADM

## 2025-02-06 RX ORDER — ONDANSETRON HYDROCHLORIDE 2 MG/ML
4 INJECTION, SOLUTION INTRAVENOUS DAILY PRN
Status: DISCONTINUED | OUTPATIENT
Start: 2025-02-06 | End: 2025-02-06 | Stop reason: HOSPADM

## 2025-02-06 RX ORDER — MIDAZOLAM HYDROCHLORIDE 1 MG/ML
INJECTION INTRAMUSCULAR; INTRAVENOUS
Status: DISCONTINUED | OUTPATIENT
Start: 2025-02-06 | End: 2025-02-06

## 2025-02-06 RX ORDER — FENTANYL CITRATE 50 UG/ML
INJECTION, SOLUTION INTRAMUSCULAR; INTRAVENOUS
Status: DISCONTINUED | OUTPATIENT
Start: 2025-02-06 | End: 2025-02-06

## 2025-02-06 RX ORDER — LANOLIN ALCOHOL/MO/W.PET/CERES
800 CREAM (GRAM) TOPICAL
Status: DISCONTINUED | OUTPATIENT
Start: 2025-02-06 | End: 2025-02-07 | Stop reason: HOSPADM

## 2025-02-06 RX ORDER — DIPHENHYDRAMINE HYDROCHLORIDE 50 MG/ML
12.5 INJECTION INTRAMUSCULAR; INTRAVENOUS
Status: DISCONTINUED | OUTPATIENT
Start: 2025-02-06 | End: 2025-02-06 | Stop reason: HOSPADM

## 2025-02-06 RX ORDER — DEXAMETHASONE SODIUM PHOSPHATE 4 MG/ML
INJECTION, SOLUTION INTRA-ARTICULAR; INTRALESIONAL; INTRAMUSCULAR; INTRAVENOUS; SOFT TISSUE
Status: DISCONTINUED | OUTPATIENT
Start: 2025-02-06 | End: 2025-02-06

## 2025-02-06 RX ORDER — ROSUVASTATIN CALCIUM 10 MG/1
10 TABLET, COATED ORAL DAILY
Status: ON HOLD | COMMUNITY
End: 2025-02-07

## 2025-02-06 RX ORDER — ONDANSETRON HYDROCHLORIDE 2 MG/ML
4 INJECTION, SOLUTION INTRAVENOUS EVERY 6 HOURS PRN
Status: DISCONTINUED | OUTPATIENT
Start: 2025-02-06 | End: 2025-02-07 | Stop reason: HOSPADM

## 2025-02-06 RX ORDER — ALUMINUM HYDROXIDE, MAGNESIUM HYDROXIDE, AND SIMETHICONE 1200; 120; 1200 MG/30ML; MG/30ML; MG/30ML
30 SUSPENSION ORAL 4 TIMES DAILY PRN
Status: DISCONTINUED | OUTPATIENT
Start: 2025-02-06 | End: 2025-02-07 | Stop reason: HOSPADM

## 2025-02-06 RX ORDER — ACETAMINOPHEN 325 MG/1
650 TABLET ORAL EVERY 4 HOURS PRN
Status: DISCONTINUED | OUTPATIENT
Start: 2025-02-06 | End: 2025-02-06

## 2025-02-06 RX ORDER — GLUCAGON 1 MG
1 KIT INJECTION
Status: DISCONTINUED | OUTPATIENT
Start: 2025-02-06 | End: 2025-02-06 | Stop reason: HOSPADM

## 2025-02-06 RX ORDER — ONDANSETRON HYDROCHLORIDE 2 MG/ML
INJECTION, SOLUTION INTRAVENOUS
Status: DISCONTINUED | OUTPATIENT
Start: 2025-02-06 | End: 2025-02-06

## 2025-02-06 RX ORDER — SUCRALFATE 1 G/1
1 TABLET ORAL 4 TIMES DAILY
COMMUNITY
End: 2025-02-09

## 2025-02-06 RX ORDER — MORPHINE SULFATE 2 MG/ML
2 INJECTION, SOLUTION INTRAMUSCULAR; INTRAVENOUS
Status: DISCONTINUED | OUTPATIENT
Start: 2025-02-06 | End: 2025-02-07 | Stop reason: HOSPADM

## 2025-02-06 RX ORDER — PROPOFOL 10 MG/ML
VIAL (ML) INTRAVENOUS CONTINUOUS PRN
Status: DISCONTINUED | OUTPATIENT
Start: 2025-02-06 | End: 2025-02-06

## 2025-02-06 RX ORDER — SCOPOLAMINE 1 MG/3D
1 PATCH, EXTENDED RELEASE TRANSDERMAL ONCE
Status: DISCONTINUED | OUTPATIENT
Start: 2025-02-06 | End: 2025-02-06

## 2025-02-06 RX ORDER — GLUCAGON 1 MG
1 KIT INJECTION
Status: DISCONTINUED | OUTPATIENT
Start: 2025-02-06 | End: 2025-02-07 | Stop reason: HOSPADM

## 2025-02-06 RX ORDER — SUCCINYLCHOLINE CHLORIDE 20 MG/ML
INJECTION INTRAMUSCULAR; INTRAVENOUS
Status: DISCONTINUED | OUTPATIENT
Start: 2025-02-06 | End: 2025-02-06

## 2025-02-06 RX ORDER — FLUTICASONE PROPIONATE 50 MCG
1 SPRAY, SUSPENSION (ML) NASAL DAILY
Status: DISCONTINUED | OUTPATIENT
Start: 2025-02-06 | End: 2025-02-07 | Stop reason: HOSPADM

## 2025-02-06 RX ORDER — INDOMETHACIN 50 MG/1
SUPPOSITORY RECTAL
Status: COMPLETED | OUTPATIENT
Start: 2025-02-06 | End: 2025-02-06

## 2025-02-06 RX ORDER — ACETAMINOPHEN 325 MG/1
650 TABLET ORAL EVERY 8 HOURS PRN
Status: DISCONTINUED | OUTPATIENT
Start: 2025-02-06 | End: 2025-02-06

## 2025-02-06 RX ORDER — TRAMADOL HYDROCHLORIDE 50 MG/1
50 TABLET ORAL EVERY 6 HOURS PRN
COMMUNITY

## 2025-02-06 RX ORDER — PROPOFOL 10 MG/ML
VIAL (ML) INTRAVENOUS
Status: DISCONTINUED | OUTPATIENT
Start: 2025-02-06 | End: 2025-02-06

## 2025-02-06 RX ORDER — ONDANSETRON 4 MG/1
4 TABLET, ORALLY DISINTEGRATING ORAL EVERY 6 HOURS PRN
COMMUNITY
Start: 2024-12-05 | End: 2025-02-09

## 2025-02-06 RX ORDER — HYDRALAZINE HYDROCHLORIDE 20 MG/ML
10 INJECTION INTRAMUSCULAR; INTRAVENOUS EVERY 4 HOURS PRN
Status: DISCONTINUED | OUTPATIENT
Start: 2025-02-06 | End: 2025-02-07 | Stop reason: HOSPADM

## 2025-02-06 RX ORDER — IBUPROFEN 200 MG
24 TABLET ORAL
Status: DISCONTINUED | OUTPATIENT
Start: 2025-02-06 | End: 2025-02-07 | Stop reason: HOSPADM

## 2025-02-06 RX ORDER — SODIUM CHLORIDE 9 MG/ML
INJECTION, SOLUTION INTRAVENOUS CONTINUOUS
Status: DISCONTINUED | OUTPATIENT
Start: 2025-02-06 | End: 2025-02-07 | Stop reason: HOSPADM

## 2025-02-06 RX ORDER — AMLODIPINE BESYLATE 5 MG/1
5 TABLET ORAL DAILY
COMMUNITY

## 2025-02-06 RX ADMIN — SUGAMMADEX 200 MG: 100 INJECTION, SOLUTION INTRAVENOUS at 03:02

## 2025-02-06 RX ADMIN — PROPOFOL 120 MCG/KG/MIN: 10 INJECTION, EMULSION INTRAVENOUS at 03:02

## 2025-02-06 RX ADMIN — SODIUM CHLORIDE, SODIUM LACTATE, POTASSIUM CHLORIDE, AND CALCIUM CHLORIDE: .6; .31; .03; .02 INJECTION, SOLUTION INTRAVENOUS at 03:02

## 2025-02-06 RX ADMIN — ROCURONIUM BROMIDE 5 MG: 10 INJECTION, SOLUTION INTRAVENOUS at 03:02

## 2025-02-06 RX ADMIN — POTASSIUM BICARBONATE 35 MEQ: 391 TABLET, EFFERVESCENT ORAL at 04:02

## 2025-02-06 RX ADMIN — MIDAZOLAM HYDROCHLORIDE 2 MG: 1 INJECTION, SOLUTION INTRAMUSCULAR; INTRAVENOUS at 03:02

## 2025-02-06 RX ADMIN — LEVOTHYROXINE SODIUM 100 MCG: 0.1 TABLET ORAL at 04:02

## 2025-02-06 RX ADMIN — SCOPOLAMINE 1 PATCH: 1.5 PATCH, EXTENDED RELEASE TRANSDERMAL at 03:02

## 2025-02-06 RX ADMIN — FENTANYL CITRATE 50 MCG: 50 INJECTION INTRAMUSCULAR; INTRAVENOUS at 03:02

## 2025-02-06 RX ADMIN — PROPOFOL 150 MG: 10 INJECTION, EMULSION INTRAVENOUS at 03:02

## 2025-02-06 RX ADMIN — PIPERACILLIN AND TAZOBACTAM 4.5 G: 4; .5 INJECTION, POWDER, LYOPHILIZED, FOR SOLUTION INTRAVENOUS; PARENTERAL at 11:02

## 2025-02-06 RX ADMIN — FAMOTIDINE 20 MG: 20 TABLET, FILM COATED ORAL at 08:02

## 2025-02-06 RX ADMIN — PIPERACILLIN AND TAZOBACTAM 4.5 G: 4; .5 INJECTION, POWDER, LYOPHILIZED, FOR SOLUTION INTRAVENOUS; PARENTERAL at 04:02

## 2025-02-06 RX ADMIN — SODIUM CHLORIDE: 9 INJECTION, SOLUTION INTRAVENOUS at 02:02

## 2025-02-06 RX ADMIN — ONDANSETRON 4 MG: 2 INJECTION INTRAMUSCULAR; INTRAVENOUS at 03:02

## 2025-02-06 RX ADMIN — POTASSIUM BICARBONATE 35 MEQ: 391 TABLET, EFFERVESCENT ORAL at 06:02

## 2025-02-06 RX ADMIN — FAMOTIDINE 20 MG: 10 INJECTION, SOLUTION INTRAVENOUS at 03:02

## 2025-02-06 RX ADMIN — LIDOCAINE 5% 1 PATCH: 700 PATCH TOPICAL at 12:02

## 2025-02-06 RX ADMIN — DEXAMETHASONE SODIUM PHOSPHATE 4 MG: 4 INJECTION, SOLUTION INTRAMUSCULAR; INTRAVENOUS at 03:02

## 2025-02-06 RX ADMIN — Medication 120 MG: at 03:02

## 2025-02-06 RX ADMIN — PIPERACILLIN AND TAZOBACTAM 4.5 G: 4; .5 INJECTION, POWDER, LYOPHILIZED, FOR SOLUTION INTRAVENOUS; PARENTERAL at 05:02

## 2025-02-06 RX ADMIN — ROCURONIUM BROMIDE 25 MG: 10 INJECTION, SOLUTION INTRAVENOUS at 03:02

## 2025-02-06 NOTE — SUBJECTIVE & OBJECTIVE
Interval History:  Patient is seen and examined during multidisciplinary rounds.  Patient is presently NPO and scheduled to undergo ERCP.  Patient reports epigastric abdominal pain.    Review of Systems   Constitutional:  Negative for chills, fever and unexpected weight change.   HENT:  Negative for rhinorrhea and sore throat.    Respiratory:  Negative for shortness of breath.    Cardiovascular:  Positive for chest pain. Negative for leg swelling.   Gastrointestinal:  Negative for abdominal pain, blood in stool, constipation, diarrhea, nausea and vomiting.   Genitourinary:  Negative for dysuria.   Musculoskeletal:  Negative for myalgias.   Skin: Negative.    Neurological:  Negative for numbness.   Hematological:  Negative for adenopathy.     Objective:     Vital Signs (Most Recent):  Temp: 97.9 °F (36.6 °C) (02/06/25 0646)  Pulse: 66 (02/06/25 0646)  Resp: 18 (02/06/25 0243)  BP: 126/72 (02/06/25 0646)  SpO2: 97 % (02/06/25 0646) Vital Signs (24h Range):  Temp:  [97.7 °F (36.5 °C)-97.9 °F (36.6 °C)] 97.9 °F (36.6 °C)  Pulse:  [57-86] 66  Resp:  [17-23] 18  SpO2:  [95 %-97 %] 97 %  BP: (123-143)/(64-90) 126/72     Weight: 92.1 kg (203 lb)  Body mass index is 35.96 kg/m².    Intake/Output Summary (Last 24 hours) at 2/6/2025 0768  Last data filed at 2/6/2025 0245  Gross per 24 hour   Intake --   Output 100 ml   Net -100 ml         Physical Exam  Constitutional:       General: She is not in acute distress.     Appearance: Normal appearance. She is not ill-appearing, toxic-appearing or diaphoretic.   HENT:      Head: Normocephalic and atraumatic.      Mouth/Throat:      Mouth: Mucous membranes are moist.   Eyes:      General: No scleral icterus.  Neck:      Comments: No JVD/retractions  Cardiovascular:      Rate and Rhythm: Regular rhythm.      Heart sounds: Normal heart sounds. No murmur heard.     No friction rub. No gallop.   Pulmonary:      Effort: Pulmonary effort is normal. No respiratory distress.      Breath  "sounds: Normal breath sounds.   Abdominal:      General: Bowel sounds are normal. There is no distension.      Palpations: Abdomen is soft. There is no mass.      Tenderness: There is no abdominal tenderness.   Musculoskeletal:      Right lower leg: No edema.      Left lower leg: No edema.   Skin:     General: Skin is warm and dry.      Coloration: Skin is not jaundiced.   Neurological:      Mental Status: She is alert.   Psychiatric:         Behavior: Behavior normal.         Thought Content: Thought content normal.         Judgment: Judgment normal.             Significant Labs: All pertinent labs within the past 24 hours have been reviewed.  CBC:   Recent Labs   Lab 02/05/25 2139 02/06/25 0308   WBC 5.80 5.33   HGB 13.9 12.6   HCT 41.9 37.6    215     CMP:   Recent Labs   Lab 02/05/25 2139 02/06/25 0307    136   K 3.6 3.4*    104   CO2 26 24   * 92   BUN 14 14   CREATININE 0.9 0.8   CALCIUM 9.3 8.7   PROT 7.2 6.6   ALBUMIN 4.3 4.0   BILITOT 1.3* 0.8   ALKPHOS 289* 239*   * 340*   * 194*   ANIONGAP 8 8     Coagulation:   Recent Labs   Lab 02/06/25  0017   INR 1.0     Lactic Acid: No results for input(s): "LACTATE" in the last 48 hours.  Lipase:   Recent Labs   Lab 02/05/25 2139   LIPASE 7     Troponin:   Recent Labs   Lab 02/05/25 2139   TROPONINIHS 5.4     TSH:   Recent Labs   Lab 02/06/25 0307   TSH 6.960*     Urine Studies: No results for input(s): "COLORU", "APPEARANCEUA", "PHUR", "SPECGRAV", "PROTEINUA", "GLUCUA", "KETONESU", "BILIRUBINUA", "OCCULTUA", "NITRITE", "UROBILINOGEN", "LEUKOCYTESUR", "RBCUA", "WBCUA", "BACTERIA", "SQUAMEPITHEL", "HYALINECASTS" in the last 48 hours.    Invalid input(s): "WRIGHTSUR"    Significant Imaging:   CXR:  When allowing for differences in projection and technique, there is little change in the heart, pulmonary vascularity or regional skeleton. Lungs are hypoinflated with some patchy areas of opacity in the lung bases. Cardiac " silhouette size is within normal limits. The pulmonary vascularity is within normal limits. No pleural effusions or pneumothoraces. No acute mikal abnormality.     CT Chest with contrast:  Small low-density lesion identified left lobe of thyroid. No mediastinal or hilar adenopathy is seen. Basilar interstitial changes bilaterally, no other consolidation. There is no parenchymal nodule or pleural effusion. The bony structures show minimal degenerative changes. Visualized upper abdomen reveals no acute abnormality.     CXR:  The cardiomediastinal silhouette is within normal limits. Mediastinal structures are midline. There is slight asymmetric elevation of the right hemidiaphragm. The lungs are symmetrically expanded with subsegmental atelectasis. No large region of confluent airspace consolidation, substantial volume of pleural fluid or pneumothorax identified. Osseous structures appear intact. Surgical clips project over the right upper quadrant.     RUQ abdominal US:  Status post cholecystectomy.  Dilated extrahepatic bile duct measuring up to 1.0 cm with mild intrahepatic biliary ductal dilatation.  No prior imaging available for comparison to assess for chronicity of this finding.  Distal common duct obstruction not excluded.  Correlation with appropriate lab values advised.  Diffuse increased echogenicity of the hepatic parenchyma suggesting hepatic steatosis.    MRCP:   Prior cholecystectomy with mild intrahepatic and extrahepatic biliary duct dilatation.  There is no focal stenosis or filling defects

## 2025-02-06 NOTE — HPI
"Patient is a 49-year-old female with a history of Graves disease, hypertension, and hypercholesterolemia; she has a family history of premature coronary disease his mother  of a myocardial infarction at age 38  Earlier today, the patient had right chest pain that was constant with a pleuritic component  She went to an outlying ER and developed central chest pain; GI cocktail did not help but the Dilaudid removed both pain focuses although she developed nausea, vomiting, and dizziness  Eventually, she was discharged home on oral [place] but then had recurrence of the right chest pain, thus prompting her to come here; she has no abdominal pain or bowel changes  Patient is afebrile and hemodynamically stable currently  CBC was unremarkable; AST/ALT were 631/247, respectively, with a T bili 1.3; lipase was negative and INR was normal with a normal troponin  EKG showed, per my interpretation, sinus rhythm at 78 beats per minute with T-wave inversion in lead 3  Chest x-ray was unremarkable here; abdominal ultrasound showed, per radiologist Dr. Caden Denise:  "Impression:     Status post cholecystectomy.  Dilated extrahepatic bile duct measuring up to 1.0 cm with mild intrahepatic biliary ductal dilatation.  No prior imaging available for comparison to assess for chronicity of this finding.  Distal common duct obstruction not excluded.  Correlation with appropriate lab values advised.     Diffuse increased echogenicity of the hepatic parenchyma suggesting hepatic steatosis."  She is admitted for further diagnosis, treatment, and care; MRCP is pending  " Vertex Distance:

## 2025-02-06 NOTE — ANESTHESIA PREPROCEDURE EVALUATION
02/06/2025  Marcelina Deluca is a 49 y.o., female.    Results for orders placed or performed in visit on 02/05/25   EKG 12-lead    Collection Time: 02/05/25  6:31 PM   Result Value Ref Range    QRS Duration 70 ms    [facility] QTC Calculation 430 ms    Narrative    Test Reason :    Vent. Rate :  78 BPM     Atrial Rate :  78 BPM     P-R Int : 148 ms          QRS Dur :  70 ms      QT Int : 378 ms       P-R-T Axes :  51  23  28 degrees    QTcB Int : 430 ms    Sinus rhythm with occasional Premature ventricular complexes  Otherwise normal ECG  No previous ECGs available    Referred By: AAAREFERRAL SELF           Confirmed By:         Imaging Results              MRI MRCP Abdomen WO Contrast 3D WO Independent WS XPD (Final result)  Result time 02/06/25 09:08:49      Final result by Connie Leone MD (02/06/25 09:08:49)                   Impression:      Prior cholecystectomy with mild intrahepatic and extrahepatic biliary duct dilatation.  There is no focal stenosis or filling defects      Electronically signed by: Connie Leone  Date:    02/06/2025  Time:    09:08               Narrative:    EXAMINATION:  MRI MRCP ABDOMEN WO CONTRAST 3D WO INDEPENDENT WS XPD    CLINICAL HISTORY:  ?CBD obstruction;    TECHNIQUE:  Multiplanar imaging through the abdomen including heavily T2 weighted slab coronal imaging was performed, with maximum intensity projections reviewed in multiple planes.    COMPARISON:  Abdominal ultrasound dated 02/05/2026    FINDINGS:  The gallbladder is absent.  Common bile duct measures 10 mm.  There is mild intrahepatic biliary duct dilatation.  Findings most likely are secondary to prior surgery.  There are no filling defects or focal stenosis.  The main pancreatic duct is normal in caliber, with no pancreatic divisum.                                       US Abdomen Limited (Final result)  Result time  02/05/25 23:31:08   Procedure changed from US Abdomen Complete     Final result by Caden Denise MD (02/05/25 23:31:08)                   Impression:      Status post cholecystectomy.  Dilated extrahepatic bile duct measuring up to 1.0 cm with mild intrahepatic biliary ductal dilatation.  No prior imaging available for comparison to assess for chronicity of this finding.  Distal common duct obstruction not excluded.  Correlation with appropriate lab values advised.    Diffuse increased echogenicity of the hepatic parenchyma suggesting hepatic steatosis.      Electronically signed by: Caden Denise MD  Date:    02/05/2025  Time:    23:31               Narrative:    EXAMINATION:  US ABDOMEN LIMITED    CLINICAL HISTORY:  elevated lfts; Chest pain, unspecified    TECHNIQUE:  Limited ultrasound of the right upper quadrant of the abdomen (including pancreas, liver, gallbladder, common bile duct, and spleen) was performed.    COMPARISON:  None.    FINDINGS:  Liver: The liver measures 15.3 cm.  There is diffuse increased echogenicity of the hepatic parenchyma which may reflect hepatic steatosis.    Gallbladder: Surgically absent.    Biliary system: There is dilatation of the extrahepatic common bile duct measuring up to 1.0 cm.  No discrete filling defect appreciated sonographically within the visualized portions of the common bile duct.  There is mild intrahepatic biliary ductal dilatation.    Pancreas: Obscured by bowel gas.    Right kidney: No evidence of hydronephrosis.    Miscellaneous: No upper abdominal ascites.                                       X-Ray Chest PA And Lateral (Final result)  Result time 02/05/25 21:52:56      Final result by Angie Denise MD (02/05/25 21:52:56)                   Impression:      Please see above.      Electronically signed by: Angie Denise MD  Date:    02/05/2025  Time:    21:52               Narrative:    EXAMINATION:  XR CHEST PA AND LATERAL    CLINICAL HISTORY:  Chest  pain, unspecified    TECHNIQUE:  PA and lateral views of the chest were performed.    COMPARISON:  None    FINDINGS:  The cardiomediastinal silhouette is within normal limits. Mediastinal structures are midline.  There is slight asymmetric elevation of the right hemidiaphragm.  The lungs are symmetrically expanded with subsegmental atelectasis.  No large region of confluent airspace consolidation, substantial volume of pleural fluid or pneumothorax identified.  Osseous structures appear intact.  Surgical clips project over the right upper quadrant.                                       Lab Results   Component Value Date    WBC 5.33 02/06/2025    HGB 12.6 02/06/2025    HCT 37.6 02/06/2025    MCV 87 02/06/2025     02/06/2025     BMP  Lab Results   Component Value Date     02/06/2025    K 3.4 (L) 02/06/2025     02/06/2025    CO2 24 02/06/2025    BUN 14 02/06/2025    CREATININE 0.8 02/06/2025    CALCIUM 8.7 02/06/2025    ANIONGAP 8 02/06/2025    GLU 92 02/06/2025     (H) 02/05/2025        Latest Reference Range & Units 02/06/25 02:26   hCG Qualitative, Urine Negative  Negative   POCT URINE PREGNANCY  Rpt    Acceptable  Yes   Rpt: View report in Results Review for more information         Pre-op Assessment    I have reviewed the Patient Summary Reports.     I have reviewed the Nursing Notes. I have reviewed the NPO Status.   I have reviewed the Medications.     Review of Systems  Anesthesia Hx:  No problems with previous Anesthesia             Denies Family Hx of Anesthesia complications.    Denies Personal Hx of Anesthesia complications.                    Social:  Former Smoker       Hematology/Oncology:  Hematology Normal   Oncology Normal                                   EENT/Dental:  EENT/Dental Normal           Cardiovascular:     Hypertension, well controlled                                          Pulmonary:  Pulmonary Normal                       Renal/:  Renal/  Normal                 Hepatic/GI:  Hepatic/GI Normal                Biliary Disease, Choledocholithiasis     Musculoskeletal:  Musculoskeletal Normal                Neurological:  Neurology Normal                                      Endocrine:   Hypothyroidism  History of Graves disease      Obesity / BMI > 30  Psych:  Psychiatric Normal                    Physical Exam  General: Well nourished, Cooperative, Alert and Oriented    Airway:  Mallampati: III   Mouth Opening: Normal  TM Distance: > 6 cm  Tongue: Normal  Neck ROM: Normal ROM    Chest/Lungs:  Clear to auscultation, Normal Respiratory Rate    Heart:  Rate: Normal  Rhythm: Regular Rhythm        Anesthesia Plan  Type of Anesthesia, risks & benefits discussed:    Anesthesia Type: Gen ETT  Intra-op Monitoring Plan: Standard ASA Monitors  Post Op Pain Control Plan: multimodal analgesia and IV/PO Opioids PRN  Induction:  IV and rapid sequence  Airway Plan: Video, Post-Induction  Informed Consent: Informed consent signed with the Patient and all parties understand the risks and agree with anesthesia plan.  All questions answered.   ASA Score: 2  Anesthesia Plan Notes:       GETA  RSI  TIVA  No Ofirmev  Benadryl 6.25mg iv, Decadron 8mg, iv Zofran 4mg iv, Scopolamine Patch   Sugammadex        Ready For Surgery From Anesthesia Perspective.     .

## 2025-02-06 NOTE — PLAN OF CARE
Counts include 234 beds at the [facility]  Initial Discharge Assessment       Primary Care Provider: Maite Armando FNP    Admission Diagnosis: Transaminitis [R74.01]    Admission Date: 2/5/2025  Expected Discharge Date: 2/7/2025    SW met with patient bedside. SW verified demographics, insurance, supports, and PCP.  Patient reported living at home alone and drives self to appointments. SW assessed patient's needs. Patient is able to complete ADLs independently. Patient verified using no at home DME. Patient verified No HH, No Dialysis, No Blood Thinners, and No Oxygen.     Patient verified pharmacy of choice: [facility] in [place], [place]  Patient confirmed her sister, Venecia Hurst, will be source of transportation at the time of discharge.     Transition of Care Barriers: None    Payor: [employer] [facility] / Plan: [facility] / Product Type: Commercial /     Extended Emergency Contact Information  Primary Emergency Contact: Venecia Hurst  Mobile Phone: [phone]  Relation: Sister  Preferred language: English    Discharge Plan A: Home  Discharge Plan B: Home      [facility] - [place], [place] - [address]  [address]  [place] [address]  Phone: [phone] Fax: [phone]    [id number] - [place], [place] - [address] AT [facility]  [address]  [place] [address]  Phone: [phone] Fax: [phone]      Initial Assessment (most recent)       Adult Discharge Assessment - 02/06/25 1134          Discharge Assessment    Assessment Type Discharge Planning Assessment     Confirmed/corrected address, phone number and insurance Yes     Confirmed Demographics Correct on Facesheet     Source of Information patient     When was your last doctors appointment? --   1 month ago    Communicated HEBERT with patient/caregiver Date not available/Unable to determine     Reason For Admission Transaminitis     People in Home alone     Do you expect to return to your current living  situation? Yes     Do you have help at home or someone to help you manage your care at home? No     Prior to hospitilization cognitive status: Unable to Assess     Current cognitive status: Alert/Oriented     Walking or Climbing Stairs Difficulty no     Dressing/Bathing Difficulty no     Home Accessibility stairs to enter home     Number of Stairs, Main Entrance three     Home Layout Able to live on 1st floor     Equipment Currently Used at Home none     Readmission within 30 days? No     Patient currently being followed by outpatient case management? No     Do you currently have service(s) that help you manage your care at home? No     Do you take prescription medications? Yes     Do you have prescription coverage? Yes     Coverage BCBS     Do you have any problems affording any of your prescribed medications? No     Is the patient taking medications as prescribed? yes     Who is going to help you get home at discharge? Leticiawilian Hurst, sister     How do you get to doctors appointments? car, drives self     Are you on dialysis? No     Do you take coumadin? No     Discharge Plan A Home     Discharge Plan B Home     DME Needed Upon Discharge  none     Discharge Plan discussed with: Patient     Transition of Care Barriers None

## 2025-02-06 NOTE — CONSULTS
GASTROENTEROLOGY INPATIENT CONSULT NOTE  Patient Name: Marcelina Deluca  Patient MRN: [id number]  Patient : 1975    Admit Date: 2025  Service date: 2025    Reason for Consult: RUQ pain / LFT elevation / dilated CBD    PCP: Maite Armando FNP    Chief Complaint   Patient presents with    Chest Pain     RAD TO BACK , ONSET THIS AM. SEEN AND TREATED AT [place]. STILL DOESN'T FEEL RIGHT       HPI: Patient is a 49 y.o. female with PMHx HTN, hypothyroid, cholecystectomy, HLD presents for evaluation of RUQ / Lower R breast pain. Acute onset, intermittent, progressive on presentation to OSH. Patient states she has been having intermittent epig pains over past months w/o clear etiology and also has had intermittent LFT elevations as well which she states no one has been able to figure out.      CHART REVIEW;   Labs  -  /  / TB 1.3  MRCP  - 1cm CBD s/p brayden; Nml panc / PD w/o divisum  CT  - small HH; s/p brayden; fatty atrophy of pancreas; tics; constipation  LFts elevated in . Resolved on f/u draw in late     Past Medical History:  Past Medical History:   Diagnosis Date    Thyroid disease         Past Surgical History:  History reviewed. No pertinent surgical history.     Home Medications:  Facility-Administered Medications Prior to Admission   Medication Dose Route Frequency Provider Last Rate Last Admin    triamcinolone acetonide injection 40 mg  40 mg Intramuscular 1 time in Clinic/HOD Faith Sharif MD         Medications Prior to Admission   Medication Sig Dispense Refill Last Dose/Taking    amLODIPine (NORVASC) 5 MG tablet Take 5 mg by mouth once daily.   2025 Morning    hydroCHLOROthiazide (MICROZIDE) 12.5 mg capsule Take 12.5 mg by mouth once daily.   2025 Morning    ibuprofen (ADVIL,MOTRIN) 600 MG tablet Take 600 mg by mouth every 6 (six) hours as needed for Temperature greater than or Pain.   Past Month    rosuvastatin (CRESTOR) 10 MG tablet Take  10 mg by mouth once daily.   2/5/2025 Morning    solifenacin (VESICARE) 10 MG tablet Take 1 tablet (10 mg total) by mouth once daily. 30 tablet 11 2/5/2025 Morning    fluticasone propionate (FLONASE) 50 mcg/actuation nasal spray 1 spray (50 mcg total) by Each Nostril route once daily. (Patient not taking: Reported on 2/6/2025) 11.1 mL 0 Not Taking    levocetirizine (XYZAL) 5 MG tablet Take 1 tablet (5 mg total) by mouth every evening. (Patient not taking: Reported on 2/6/2025) 30 tablet 0 Not Taking    levothyroxine (SYNTHROID) 112 MCG tablet Take 112 mcg by mouth every morning.   2/4/2025 Evening    ondansetron (ZOFRAN-ODT) 4 MG TbDL Take 4 mg by mouth every 6 (six) hours as needed. (Patient not taking: Reported on 2/6/2025)   Not Taking    pantoprazole (PROTONIX) 40 MG tablet Take 40 mg by mouth once daily. (Patient not taking: Reported on 2/6/2025)   Not Taking    sucralfate (CARAFATE) 1 gram tablet Take 1 g by mouth 4 (four) times daily. (Patient not taking: Reported on 2/6/2025)   Not Taking    traMADoL (ULTRAM) 50 mg tablet Take 50 mg by mouth every 6 (six) hours as needed. (Patient not taking: Reported on 2/6/2025)   Not Taking       Inpatient Medications:   enoxaparin  40 mg Subcutaneous Daily    famotidine  20 mg Oral BID    fluticasone propionate  1 spray Each Nostril Daily    [START ON 2/7/2025] levothyroxine  125 mcg Oral QAM    LIDOcaine  1 patch Transdermal Q24H    piperacillin-tazobactam (Zosyn) IV (PEDS and ADULTS) (extended infusion is not appropriate)  4.5 g Intravenous Q8H    scopolamine  1 patch Transdermal Once       Current Facility-Administered Medications:     aluminum-magnesium hydroxide-simethicone, 30 mL, Oral, QID PRN    dextrose 50%, 12.5 g, Intravenous, PRN    dextrose 50%, 25 g, Intravenous, PRN    glucagon (human recombinant), 1 mg, Intramuscular, PRN    glucose, 16 g, Oral, PRN    glucose, 24 g, Oral, PRN    hydrALAZINE, 10 mg, Intravenous, Q4H PRN    magnesium oxide, 800 mg, Oral,  "PRN    magnesium oxide, 800 mg, Oral, PRN    melatonin, 6 mg, Oral, Nightly PRN    morphine, 2 mg, Intravenous, Q2H PRN    naloxone, 0.02 mg, Intravenous, PRN    ondansetron, 4 mg, Intravenous, Q6H PRN    potassium bicarbonate, 35 mEq, Oral, PRN    potassium bicarbonate, 50 mEq, Oral, PRN    potassium bicarbonate, 60 mEq, Oral, PRN    potassium, sodium phosphates, 2 packet, Oral, PRN    potassium, sodium phosphates, 2 packet, Oral, PRN    potassium, sodium phosphates, 2 packet, Oral, PRN    Review of patient's allergies indicates:   Allergen Reactions    Opioids - morphine analogues Palpitations and Other (See Comments)     Increased chest pain       Social History:   Social History     Occupational History    Not on file   Tobacco Use    Smoking status: Former     Current packs/day: 0.00     Average packs/day: 1 pack/day for 28.0 years (28.0 ttl pk-yrs)     Types: Cigarettes, Vaping with nicotine     Start date:      Quit date:      Years since quittin.1    Smokeless tobacco: Never   Substance and Sexual Activity    Alcohol use: Not on file    Drug use: Not on file    Sexual activity: Not on file       Family History:   Family History   Problem Relation Name Age of Onset    Melanoma Neg Hx         Review of Systems:  A 10 point review of systems was performed and was normal, except as mentioned in the HPI, including constitutional, HEENT, heme, lymph, cardiovascular, respiratory, gastrointestinal, genitourinary, neurologic, endocrine, psychiatric and musculoskeletal.      OBJECTIVE:    Physical Exam:  24 Hour Vital Sign Ranges: Temp:  [97.7 °F (36.5 °C)-97.9 °F (36.6 °C)] 97.8 °F (36.6 °C)  Pulse:  [57-86] 71  Resp:  [16-23] 16  SpO2:  [95 %-97 %] 96 %  BP: (123-143)/(64-90) 131/78  Most recent vitals: /78 (BP Location: Left arm, Patient Position: Lying)   Pulse 71   Temp 97.8 °F (36.6 °C) (Oral)   Resp 16   Ht 5' 3" (1.6 m)   Wt 92.1 kg (203 lb)   LMP 2025   SpO2 96%   Breastfeeding " "No   BMI 35.96 kg/m²    GEN: well-developed, well-nourished, awake and alert, non-toxic appearing adult  HEENT: PERRL, sclera anicteric, oral mucosa pink and moist without lesion  NECK: trachea midline; Good ROM  CV: regular rate and rhythm, no murmurs or gallops  RESP: clear to auscultation bilaterally, no wheezes, rhonci or rales  ABD: soft, non-tender, non-distended, normal bowel sounds  EXT: no swelling or edema, 2+ pulses distally  SKIN: no rashes or jaundice  PSYCH: normal affect    Labs:   Recent Labs     02/05/25 2139 02/06/25  0308   WBC 5.80 5.33   MCV 88 87    215     Recent Labs     02/05/25 2139 02/06/25  0307    136   K 3.6 3.4*    104   CO2 26 24   BUN 14 14   * 92     No results for input(s): "ALB" in the last 72 hours.    Invalid input(s): "ALKP", "SGOT", "SGPT", "TBIL", "DBIL", "TPRO"  Recent Labs     02/06/25  0017   INR 1.0         Radiology Review:  MRI MRCP Abdomen WO Contrast 3D WO Independent WS XPD   Final Result      Prior cholecystectomy with mild intrahepatic and extrahepatic biliary duct dilatation.  There is no focal stenosis or filling defects         Electronically signed by: Connie Leone   Date:    02/06/2025   Time:    09:08      US Abdomen Limited   Final Result      Status post cholecystectomy.  Dilated extrahepatic bile duct measuring up to 1.0 cm with mild intrahepatic biliary ductal dilatation.  No prior imaging available for comparison to assess for chronicity of this finding.  Distal common duct obstruction not excluded.  Correlation with appropriate lab values advised.      Diffuse increased echogenicity of the hepatic parenchyma suggesting hepatic steatosis.         Electronically signed by: Caden Denise MD   Date:    02/05/2025   Time:    23:31      X-Ray Chest PA And Lateral   Final Result      Please see above.         Electronically signed by: Angie Denise MD   Date:    02/05/2025   Time:    21:52      FL ERCP Biliary And Pancreatic By " Rad Tech    (Results Pending)         IMPRESSION / RECOMMENDATIONS:  49 y.o. female with PMHx HTN, hypothyroid, cholecystectomy, HLD presents for evaluation of RUQ pain w/ acute onset LFT elevation. Given intermittent pain episodes w/o clear etiology over past months w/ now 2nd episode of acute LFT elevation, high suspicion for SOD vs ampullary stenosis vs sludge/stone not seen on MRI ar w/ dilation of 1cm on CBD. This pattern of intermittent LFT elevation would not be expected w/ steatosis or other intrinsic liver disease.  Offered conservative management vs endoscopic evaluation. Suspect w/ have recurrences of these episodes if conservative management. RIsks, benefits, alternatives discussed in detail w/ patient regarding upcoming procedures and sedation and possible complications. Some of the more common endoscopic complications include but not limited to immediate or delayed perforation, bleeding, infections, pain, inadvertent injury to surrounding tissue / organs (ar pancreas) and possible need for surgical evaluation. All questions answered and will proceed with ERCP as planned.      -ERCP sphincterotomy today     Thank you for this consult.    Eliseo Martin III  2/6/2025  3:11 PM

## 2025-02-06 NOTE — [employer]OVATION PATIENT INSTRUCTIONS
Discharge Summary/Instructions after an Endoscopic Procedure  Patient Name: Marcelina Deluca  Patient MRN: [id number]  Patient YOB: 1975 Thursday, February 6, 2025  Eliseo Martin III, MD  RESTRICTIONS:  During your procedure today, you received medications for sedation.  These   medications may affect your judgment, balance and coordination.  Therefore,   for 24 hours, you have the following restrictions:   - DO NOT drive a car, operate machinery, make legal/financial decisions,   sign important papers or drink alcohol.    ACTIVITY:  Today: no heavy lifting, straining or running due to procedural   sedation/anesthesia.  The following day: return to full activity including work.  DIET:  Eat and drink normally unless instructed otherwise.     TREATMENT FOR COMMON SIDE EFFECTS:  - Mild abdominal pain, nausea, belching, bloating or excessive gas:  rest,   eat lightly and use a heating pad.  - Sore Throat: treat with throat lozenges and/or gargle with warm salt   water.  - Because air was used during the procedure, expelling large amounts of air   from your rectum or belching is normal.  - If a bowel prep was taken, you may not have a bowel movement for 1-3 days.    This is normal.  SYMPTOMS TO WATCH FOR AND REPORT TO YOUR PHYSICIAN:  1. Abdominal pain or bloating, other than gas cramps.  2. Chest pain.  3. Back pain.  4. Signs of infection such as: chills or fever occurring within 24 hours   after the procedure.  5. Rectal bleeding, which would show as bright red, maroon, or black stools.   (A tablespoon of blood from the rectum is not serious, especially if   hemorrhoids are present.)  6. Vomiting.  7. Weakness or dizziness.  GO DIRECTLY TO THE NEAREST EMERGENCY ROOM IF YOU HAVE ANY OF THE FOLLOWING:      Difficulty breathing              Chills and/or fever over 101 F   Persistent vomiting and/or vomiting blood   Severe abdominal pain   Severe chest pain   Black, tarry stools   Bleeding- more than one  tablespoon   Any other symptom or condition that you feel may need urgent attention  Your doctor recommends these additional instructions:  If any biopsies were taken, your doctors clinic will contact you in 1 to 2   weeks with any results.  - Advance diet as tolerated.   - Continue present medications.   - Patient has a contact number available for emergencies.  The signs and   symptoms of potential delayed complications were discussed with the   patient.  Return to normal activities tomorrow.  Written discharge   instructions were provided to the patient.   - [facility]. Ok to d/c in AM as long   as no issues.  For questions, problems or results please call your physician - Eliseo Martin III, MD at Work:  [phone].  [facility], EMERGENCY ROOM PHONE NUMBER: [phone]  IF A COMPLICATION OR EMERGENCY SITUATION ARISES AND YOU ARE UNABLE TO REACH   YOUR PHYSICIAN - GO DIRECTLY TO THE EMERGENCY ROOM.  Eliseo Martin III, MD  2/6/2025 3:58:39 PM  This report has been verified and signed electronically.  Dear patient,  As a result of recent federal legislation (The Federal Cures Act), you may   receive lab or pathology results from your procedure in your [facility]   account before your physician is able to contact you. Your physician or   their representative will relay the results to you with their   recommendations at their soonest availability.  Thank you,  PROVATION

## 2025-02-06 NOTE — TRANSFER OF CARE
"Anesthesia Transfer of Care Note    Patient: Marcelina Deluca    Procedure(s) Performed: Procedure(s) (LRB):  ERCP (ENDOSCOPIC RETROGRADE CHOLANGIOPANCREATOGRAPHY) (N/A)    Patient location: PACU    Anesthesia Type: general    Transport from OR: Transported from OR on room air with adequate spontaneous ventilation    Post pain: adequate analgesia    Post assessment: no apparent anesthetic complications and tolerated procedure well    Post vital signs: stable    Level of consciousness: awake, alert and oriented    Nausea/Vomiting: no nausea/vomiting    Complications: none    Transfer of care protocol was followed      Last vitals: Visit Vitals  BP (!) 145/68   Pulse 67   Temp 36.6 °C (97.8 °F) (Oral)   Resp 16   Ht 5' 3" (1.6 m)   Wt 92.1 kg (203 lb)   LMP 01/28/2025   SpO2 98%   Breastfeeding No   BMI 35.96 kg/m²     "

## 2025-02-06 NOTE — HOSPITAL COURSE
Patient admitted to Hospital Medicine.  Patient maintained on intravenous Zosyn antibiotic therapy.  Patient made NPO.  Patient underwent MRCP.  Patient was evaluated by GI specialist who performed ERCP.

## 2025-02-06 NOTE — FIRST PROVIDER EVALUATION
Emergency Department TeleTriage Encounter Note      CHIEF COMPLAINT    Chief Complaint   Patient presents with    Chest Pain     RAD TO BACK , ONSET THIS AM. SEEN AND TREATED AT [place]. STILL DOESN'T FEEL RIGHT       VITAL SIGNS   Initial Vitals [02/05/25 1833]   BP Pulse Resp Temp SpO2   (!) 143/90 86 18 97.9 °F (36.6 °C) 95 %      MAP       --            ALLERGIES    Review of patient's allergies indicates:   Allergen Reactions    Opioids - morphine analogues Palpitations and Other (See Comments)     Increased chest pain       PROVIDER TRIAGE NOTE  This is a teletriage evaluation of a 49 y.o. female presenting to the ED complaining of chest pain. Patient reports sharp right sided chest pain since this morning. Workup done at [place] this morning without known etiology of pain, including CT chest.    Patient is alert and oriented. She speaks in complete sentences. She is sitting upright in the chair in no distress.     Initial orders will be placed and care will be transferred to an alternate provider when patient is roomed for a full evaluation. Any additional orders and the final disposition will be determined by that provider.         ORDERS  Labs Reviewed   CBC W/ AUTO DIFFERENTIAL   COMPREHENSIVE METABOLIC PANEL   TROPONIN I HIGH SENSITIVITY       ED Orders (720h ago, onward)      Start Ordered     Status Ordering Provider    02/05/25 1847 02/05/25 1846  CBC auto differential  STAT         Ordered JORDAN LI    02/05/25 1847 02/05/25 1846  Comprehensive metabolic panel  STAT         Ordered JORDAN LI    02/05/25 1847 02/05/25 1846  Insert Saline lock IV  Once         Ordered JORDAN LI    02/05/25 1847 02/05/25 1846  Troponin I High Sensitivity  STAT         Ordered JORDAN LI              Virtual Visit Note: The provider triage portion of this emergency department evaluation and documentation was performed via [employer], a HIPAA-compliant telemedicine application, in concert with a  tele-presenter in the room. A face to face patient evaluation with one of my colleagues will occur once the patient is placed in an emergency department room.      DISCLAIMER: This note was prepared with [employer] voice recognition transcription software. Garbled syntax, mangled pronouns, and other bizarre constructions may be attributed to that software system.

## 2025-02-06 NOTE — ANESTHESIA PROCEDURE NOTES
Intubation    Date/Time: 2/6/2025 3:32 PM    Performed by: Jace Ferreira CRNA  Authorized by: Osman Welsh MD    Intubation:     Induction:  Rapid sequence induction    Intubated:  Postinduction    Mask Ventilation:  N/a    Attempts:  1    Attempted By:  CRNA    Method of Intubation:  Video laryngoscopy    Blade:  Gardner 3    Laryngeal View Grade: Grade I - full view of cords      Difficult Airway Encountered?: No      Complications:  None    Airway Device:  Oral endotracheal tube    Airway Device Size:  7.0    Style/Cuff Inflation:  Cuffed    Inflation Amount (mL):  7    Tube secured:  21    Secured at:  The lips    Placement Verified By:  Capnometry    Complicating Factors:  None    Findings Post-Intubation:  BS equal bilateral and atraumatic/condition of teeth unchanged

## 2025-02-06 NOTE — H&P
"  [facility] - Emergency Dept  Hospital Medicine  History & Physical    Patient Name: Marcelina Deluca  MRN: [id number]  Patient Class: Emergency  Admission Date: 2025  Attending Physician: Megan Sales MD  Primary Care Provider: Maite Armando FNP    Patient seen at 1:16 a.m. on 2025.  History is obtained from the patient/ER physician/records  Subjective:     Principal Problem:  Choledocholithiasis    Chief Complaint:   Chief Complaint   Patient presents with    Chest Pain     RAD TO BACK , ONSET THIS AM. SEEN AND TREATED AT [place]. STILL DOESN'T FEEL RIGHT      HPI: Patient is a 49-year-old female with a history of Graves disease, hypertension, and hypercholesterolemia; she has a family history of premature coronary disease as her mother  of a myocardial infarction at age 38  -Earlier today, the patient had right chest pain that was constant with a pleuritic component  -She went to an outlying ER and then developed central chest pain; GI cocktail did not help but Dilaudid eliminated both areas of pain although she developed nausea, vomiting, and dizziness  -Eventually, she was discharged home on oral [place] but then had recurrence of the right chest pain, thus prompting her to come here; she has no abdominal pain or bowel changes  -Patient is afebrile and hemodynamically stable currently  CBC was unremarkable; AST/ALT were 631/247, respectively, with a T bili 1.3; lipase was negative and INR was normal with a normal troponin  -EKG showed, per my interpretation, sinus rhythm at 78 beats per minute with T-wave inversion in lead 3  -Chest x-ray was unremarkable here; abdominal ultrasound showed, per radiologist Dr. Caden Denise:    "Impression:     Status post cholecystectomy.  Dilated extrahepatic bile duct measuring up to 1.0 cm with mild intrahepatic biliary ductal dilatation.  No prior imaging available for comparison to assess for chronicity of this finding.  Distal common duct " "obstruction not excluded.  Correlation with appropriate lab values advised.     Diffuse increased echogenicity of the hepatic parenchyma suggesting hepatic steatosis."    -She is admitted for further diagnosis, treatment, and care; MRCP is pending    Past Medical History:   Diagnosis Date    Thyroid disease        No past surgical history on file.    Review of patient's allergies indicates:   Allergen Reactions    Opioids - morphine analogues Palpitations and Other (See Comments)     Increased chest pain       Current Facility-Administered Medications on File Prior to Encounter   Medication    triamcinolone acetonide injection 40 mg     Current Outpatient Medications on File Prior to Encounter   Medication Sig    clindamycin (CLEOCIN) 300 MG capsule Take 300 mg by mouth 3 (three) times daily.    desoximetasone (TOPICORT) 0.05 % cream Apply to pruritic plaques on body daily to BID    fluticasone propionate (FLONASE) 50 mcg/actuation nasal spray 1 spray (50 mcg total) by Each Nostril route once daily.    hydroCHLOROthiazide (MICROZIDE) 12.5 mg capsule Take 12.5 mg by mouth.    levocetirizine (XYZAL) 5 MG tablet Take 1 tablet (5 mg total) by mouth every evening.    levothyroxine (SYNTHROID) 100 MCG tablet Take 1 tablet by mouth every morning.    mometasone (ELOCON) 0.1 % ointment aaa daily to bid prn rash (Patient not taking: Reported on 11/20/2024)    mupirocin (BACTROBAN) 2 % ointment Apply topically 2 (two) times daily.    solifenacin (VESICARE) 10 MG tablet Take 1 tablet (10 mg total) by mouth once daily.     Family History    None       Tobacco Use    Smoking status: No     Types:     Smokeless tobacco:    Substance and Sexual Activity    Alcohol use: No    Drug use: No          Review of Systems   Constitutional:  Negative for chills, fever and unexpected weight change.   HENT:  Negative for rhinorrhea and sore throat.    Respiratory:  Negative for shortness of breath.    Cardiovascular:  Positive for chest pain. " Negative for leg swelling.   Gastrointestinal:  Negative for abdominal pain, blood in stool, constipation, diarrhea, nausea and vomiting.   Genitourinary:  Negative for dysuria.   Musculoskeletal:  Negative for myalgias.   Skin: Negative.    Neurological:  Negative for numbness.   Hematological:  Negative for adenopathy.     Objective:     Vital Signs (Most Recent):  Temp: 97.9 °F (36.6 °C) (02/05/25 1833)  Pulse: 60 (02/06/25 0000)  Resp: (!) 23 (02/06/25 0000)  BP: 134/73 (02/06/25 0000)  SpO2: 97 % (02/06/25 0000) Vital Signs (24h Range):  Temp:  [97.9 °F (36.6 °C)] 97.9 °F (36.6 °C)  Pulse:  [60-86] 60  Resp:  [18-23] 23  SpO2:  [95 %-97 %] 97 %  BP: (134-143)/(73-90) 134/73     Weight: 90.7 kg (200 lb)  Body mass index is 35.43 kg/m².     Physical Exam  Constitutional:       General: She is not in acute distress.     Appearance: Normal appearance. She is not ill-appearing, toxic-appearing or diaphoretic.   HENT:      Head: Normocephalic and atraumatic.      Mouth/Throat:      Mouth: Mucous membranes are moist.   Eyes:      General: No scleral icterus.  Neck:      Comments: No JVD/retractions  Cardiovascular:      Rate and Rhythm: Regular rhythm.      Heart sounds: Normal heart sounds. No murmur heard.     No friction rub. No gallop.   Pulmonary:      Effort: Pulmonary effort is normal. No respiratory distress.      Breath sounds: Normal breath sounds.   Abdominal:      General: Bowel sounds are normal. There is no distension.      Palpations: Abdomen is soft. There is no mass.      Tenderness: There is no abdominal tenderness.   Musculoskeletal:      Right lower leg: No edema.      Left lower leg: No edema.   Skin:     General: Skin is warm and dry.      Coloration: Skin is not jaundiced.   Neurological:      Mental Status: She is alert.   Psychiatric:         Behavior: Behavior normal.         Thought Content: Thought content normal.         Judgment: Judgment normal.      Significant studies:  Reviewed.  Assessment/Plan:     (Possible) Choledocholithiasis (versus mass)/Transaminitis    -NPO  -Maintenance fluids  -Empiric Zosyn  -Prn IV Morphine  -Prn Zofran  -MRCP pending  -GI consultation-> may need ERCP  -hCG, Tylenol level pending    Primary hypertension    -Stable  -Hold HCTZ and use prn IV Hydralazine    Graves disease/Postablative hypothyroidism    -Continue Synthroid  -TSH nonsuppressed at 8.07 almost a year ago-> recheck       Megan Sales MD  Department of Hospital Medicine  [facility] - Emergency Dept

## 2025-02-06 NOTE — ASSESSMENT & PLAN NOTE
NPO  Maintenance fluids  Empiric Zosyn  Prn IV Morphine  Prn Zofran  MRCP results reviewed.  Follow ERCP results.

## 2025-02-06 NOTE — ED PROVIDER NOTES
Encounter Date: 2/5/2025       History     Chief Complaint   Patient presents with    Chest Pain     RAD TO BACK , ONSET THIS AM. SEEN AND TREATED AT [place]. STILL DOESN'T FEEL RIGHT     HPI    49-year-old female with past medical history of Graves disease presents to the emergency department with concerns of right-sided chest pain that began this morning.  She states that the chest pain radiates to her back, states that it worsens with movement.  She reports that she currently feels nauseous.  She denies associated shortness for breath, fever, chills, cough, abdominal pain, dyspnea on exertion, lower extremity edema.  She denies being on oral contraceptives currently, prior history of blood clots, family history of blood clots.  She states that she was seen at an outside hospital.  Her pain mildly improved after she was given morphine.  She states she was discharged and she took some Lortab, her symptoms did not improve so she presented to our facility.  She denies any associated trauma or recent falls.  She denies any heavy lifting of objects.    On chart review patient was recently seen at [facility] for similar symptoms.  At their facility she had a D-dimer performed that has less than 200.  Also had basic lab work including a CMP that had an AST of 12 and an ALT of 5.  CT chest was performed that showed no acute abnormality.  BNP at outside hospital was 19.    Review of patient's allergies indicates:   Allergen Reactions    Opioids - morphine analogues Palpitations and Other (See Comments)     Increased chest pain     Past Medical History:   Diagnosis Date    Thyroid disease      No past surgical history on file.  Family History   Problem Relation Name Age of Onset    Melanoma Neg Hx       Social History     Tobacco Use    Smoking status: Every Day     Types: Cigarettes, Vaping with nicotine    Smokeless tobacco: Never     Review of Systems   Constitutional:  Negative for fever.   HENT:  Negative for  sore throat.    Respiratory:  Negative for cough and shortness of breath.    Cardiovascular:  Positive for chest pain. Negative for leg swelling.   Gastrointestinal:  Positive for nausea. Negative for abdominal pain and vomiting.   Genitourinary:  Negative for dysuria.   Musculoskeletal:  Negative for back pain.   Skin:  Negative for rash.   Neurological:  Negative for weakness.   Hematological:  Does not bruise/bleed easily.       Physical Exam     Initial Vitals [02/05/25 1833]   BP Pulse Resp Temp SpO2   (!) 143/90 86 18 97.9 °F (36.6 °C) 95 %      MAP       --         Physical Exam    Nursing note and vitals reviewed.  Constitutional: She appears well-developed and well-nourished.  Non-toxic appearance.   HENT:   Head: Normocephalic and atraumatic.   Eyes: EOM are normal. Pupils are equal, round, and reactive to light.   Neck: Neck supple.   Normal range of motion.  Cardiovascular:  Normal rate and regular rhythm.           Pulmonary/Chest: Breath sounds normal. No respiratory distress.   Some reproducible chest wall tenderness to palpation towards the right   Musculoskeletal:      Cervical back: Normal range of motion and neck supple.     Neurological: She is alert and oriented to person, place, and time.   Skin: Skin is warm and dry.         ED Course   Procedures  Labs Reviewed   CBC W/ AUTO DIFFERENTIAL - Abnormal       Result Value    WBC 5.80      RBC 4.79      Hemoglobin 13.9      Hematocrit 41.9      MCV 88      MCH 29.0      MCHC 33.2      RDW 12.6      Platelets 232      MPV 10.5      Immature Granulocytes 0.3      Gran # (ANC) 4.8      Immature Grans (Abs) 0.02      Lymph # 0.7 (*)     Mono # 0.3      Eos # 0.0      Baso # 0.02      nRBC 0      Gran % 82.5 (*)     Lymph % 11.4 (*)     Mono % 5.3      Eosinophil % 0.2      Basophil % 0.3      Differential Method Automated     COMPREHENSIVE METABOLIC PANEL - Abnormal    Sodium 137      Potassium 3.6      Chloride 103      CO2 26      Glucose 131 (*)      BUN 14      Creatinine 0.9      Calcium 9.3      Total Protein 7.2      Albumin 4.3      Total Bilirubin 1.3 (*)     Alkaline Phosphatase 289 (*)      (*)      (*)     eGFR >60.0      Anion Gap 8     TROPONIN I HIGH SENSITIVITY    Troponin I High Sensitivity 5.4     ACETAMINOPHEN LEVEL   HEPATITIS PANEL, ACUTE   LIPASE   LIPASE    Lipase 7     PROTIME-INR   URINALYSIS, REFLEX TO URINE CULTURE   HEPATITIS PANEL, ACUTE   ACETAMINOPHEN LEVEL   POCT URINE PREGNANCY          Imaging Results              US Abdomen Limited (Final result)  Result time 02/05/25 23:31:08   Procedure changed from US Abdomen Complete     Final result by Caden Denise MD (02/05/25 23:31:08)                   Impression:      Status post cholecystectomy.  Dilated extrahepatic bile duct measuring up to 1.0 cm with mild intrahepatic biliary ductal dilatation.  No prior imaging available for comparison to assess for chronicity of this finding.  Distal common duct obstruction not excluded.  Correlation with appropriate lab values advised.    Diffuse increased echogenicity of the hepatic parenchyma suggesting hepatic steatosis.      Electronically signed by: Caden Denise MD  Date:    02/05/2025  Time:    23:31               Narrative:    EXAMINATION:  US ABDOMEN LIMITED    CLINICAL HISTORY:  elevated lfts; Chest pain, unspecified    TECHNIQUE:  Limited ultrasound of the right upper quadrant of the abdomen (including pancreas, liver, gallbladder, common bile duct, and spleen) was performed.    COMPARISON:  None.    FINDINGS:  Liver: The liver measures 15.3 cm.  There is diffuse increased echogenicity of the hepatic parenchyma which may reflect hepatic steatosis.    Gallbladder: Surgically absent.    Biliary system: There is dilatation of the extrahepatic common bile duct measuring up to 1.0 cm.  No discrete filling defect appreciated sonographically within the visualized portions of the common bile duct.  There is mild  intrahepatic biliary ductal dilatation.    Pancreas: Obscured by bowel gas.    Right kidney: No evidence of hydronephrosis.    Miscellaneous: No upper abdominal ascites.                                       X-Ray Chest PA And Lateral (Final result)  Result time 02/05/25 21:52:56      Final result by Angie Denise MD (02/05/25 21:52:56)                   Impression:      Please see above.      Electronically signed by: Angie Denise MD  Date:    02/05/2025  Time:    21:52               Narrative:    EXAMINATION:  XR CHEST PA AND LATERAL    CLINICAL HISTORY:  Chest pain, unspecified    TECHNIQUE:  PA and lateral views of the chest were performed.    COMPARISON:  None    FINDINGS:  The cardiomediastinal silhouette is within normal limits. Mediastinal structures are midline.  There is slight asymmetric elevation of the right hemidiaphragm.  The lungs are symmetrically expanded with subsegmental atelectasis.  No large region of confluent airspace consolidation, substantial volume of pleural fluid or pneumothorax identified.  Osseous structures appear intact.  Surgical clips project over the right upper quadrant.                                       Medications   LIDOcaine 5 % patch 1 patch (1 patch Transdermal Patch Applied 2/6/25 0017)   ondansetron disintegrating tablet 4 mg (4 mg Oral Given 2/5/25 2314)   ketorolac injection 15 mg (15 mg Intravenous Given 2/5/25 2314)     Medical Decision Making  Amount and/or Complexity of Data Reviewed  Labs: ordered. Decision-making details documented in ED Course.  Radiology: ordered. Decision-making details documented in ED Course.    Risk  Prescription drug management.  Decision regarding hospitalization.    In brief, 49-year-old female with past medical history of Graves disease presents to the emergency department with complaint of right-sided chest pain that began this morning that radiates to her back.  Worsens with movements.  No associated shortness for breath, fever,  "chills, cough, abdominal pain, OCP use.  Initial vital signs with mildly elevated blood pressure.  Physical examination as stated above.    Chart Review:  Chart review as stated in HPI      /73   Pulse 60   Temp 97.9 °F (36.6 °C) (Oral)   Resp (!) 23   Ht 5' 3" (1.6 m)   Wt 90.7 kg (200 lb)   LMP 01/28/2025   SpO2 97%   BMI 35.43 kg/m²     Differentials:  ACS, arrhythmia, esophagitis, gastritis, musculoskeletal pain/strain, electrolyte abnormality, metabolic derangement, pulmonary embolism, pneumothorax, among others.    Orders:  CBC, CMP, troponin, lipase, Tylenol level, hepatitis panel, EKG, chest x-ray, right upper quadrant ultrasound, PT INR    EKG:  On my independent interpretation patient's EKG returned sinus rhythm at a rate of 78 beats per minute without acute ST elevation or depression.  Has premature ventricular complexes noted.  Normal axis.  Normal intervals.    Chest x-ray:  On my independent interpretation of patient's chest x-ray no pneumothorax, no focal consolidation concerning for pneumonia no widened mediastinum, no pleural effusions    Results:  Please refer to ED course     Interventions:  Zofran, Toradol, Lidoderm    Plan:  Initially concerned for musculoskeletal pain/strain resulting in patient's symptoms given her reproducible chest wall tenderness though that has also positive with the patient may have a retained stone resulting in choledocholithiasis and referred pain.  Given patient's newly elevated transaminitis with elevated alkaline phosphatase and total bilirubin she was consulted to the medicine service for admission, will likely need an MRCP in the morning and GI consultation.  Case was discussed with Dr. Andujar.    This text was transcribed using voice software.    MDM MATRIX SUMMARY    Nory Carranza MD  PGY-4 [employer]  10:49 PM 2/5/2025\            ED Course as of 02/06/25 0029   Wed Feb 05, 2025   8066 Comprehensive metabolic panel(!)  Independently " interpreted by me.  Abnormal with AST elevated at 631, ALT of 247 and T bili elevated at 1.3 with elevated alkaline phosphatase.  This has significantly increased from her prior lab work performed at [facility].  Given these abnormalities that has possible that patient is having referred pain in the setting of possible acute cholecystitis or cholelithiasis.  Also possible for acute toxidrome/hepatitis.  Tylenol level was added on as well as hepatitis panel, lipase as well as a UPT and UA. [SB]   2253 CBC auto differential(!)  Independently interpreted by me, within normal limits [SB]   2253 Troponin I High Sensitivity  Independently interpreted by me, within normal limits [SB]   2254 X-Ray Chest PA And Lateral  Independently interpreted by me, no pneumothorax, no focal consolidation concerning for pneumonia, low lung volumes.  No widened mediastinum. [SB]   2351 US Abdomen Limited  Status post cholecystectomy.  Dilated extrahepatic bile duct measuring up to 1.0 cm with mild intrahepatic biliary ductal dilatation.  No prior imaging available for comparison to assess for chronicity of this finding.  Distal common duct obstruction not excluded.  Correlation with appropriate lab values advised.   [SB]   Thu Feb 06, 2025   0029 Consulted to medicine service for admission at this time [SB]      ED Course User Index  [SB] Nory Carranza MD                             Clinical Impression:  Final diagnoses:  [R07.9] Chest pain  [R74.01] Transaminitis (Primary)          ED Disposition Condition    Admit Stable                Nory Carranza MD  Resident  02/06/25 0029

## 2025-02-06 NOTE — ASSESSMENT & PLAN NOTE
NPO  Maintenance fluids  Empiric Zosyn  Prn IV Morphine  Prn Zofran  MRCP pending  GI consultation-> may need ERCP

## 2025-02-06 NOTE — ASSESSMENT & PLAN NOTE
MRCP results reviewed, follow acute hepatitis panel.   Follow GI recommendations.  Scheduled for ERCP today.

## 2025-02-06 NOTE — PLAN OF CARE
CM attempted to schedule PCP follow up - no answer from clinic. CM to re-attempt    Children to transport home on tomorrow, pending medical clearance     02/06/25 6608   Post-Acute Status   Post-Acute Authorization Other   Other Status Awaiting f/u Appts

## 2025-02-06 NOTE — ANESTHESIA POSTPROCEDURE EVALUATION
Anesthesia Post Evaluation    Patient: Marcelina Deluca    Procedure(s) Performed: Procedure(s) (LRB):  ERCP (ENDOSCOPIC RETROGRADE CHOLANGIOPANCREATOGRAPHY) (N/A)    Final Anesthesia Type: general      Patient location during evaluation: PACU  Patient participation: Yes- Able to Participate  Level of consciousness: awake and alert, oriented and awake  Post-procedure vital signs: reviewed and stable  Pain management: adequate  Airway patency: patent    PONV status at discharge: No PONV  Anesthetic complications: no      Cardiovascular status: blood pressure returned to baseline, hemodynamically stable and stable  Respiratory status: unassisted, spontaneous ventilation and room air  Hydration status: euvolemic  Follow-up not needed.              Vitals Value Taken Time   /71 02/06/25 1630   Temp 36.1 °C (97 °F) 02/06/25 1555   Pulse 56 02/06/25 1631   Resp 20 02/06/25 1631   SpO2 96 % 02/06/25 1631   Vitals shown include unfiled device data.      No case tracking events are documented in the log.      Pain/Keiry Score: Presence of Pain: denies (2/6/2025  2:31 AM)  Pain Rating Prior to Med Admin: 0 (2/6/2025  5:06 AM)  Keiry Score: 10 (2/6/2025  4:15 PM)

## 2025-02-06 NOTE — SUBJECTIVE & OBJECTIVE
Past Medical History:   Diagnosis Date    Thyroid disease        No past surgical history on file.    Review of patient's allergies indicates:   Allergen Reactions    Opioids - morphine analogues Palpitations and Other (See Comments)     Increased chest pain       Current Facility-Administered Medications on File Prior to Encounter   Medication    triamcinolone acetonide injection 40 mg     Current Outpatient Medications on File Prior to Encounter   Medication Sig    clindamycin (CLEOCIN) 300 MG capsule Take 300 mg by mouth 3 (three) times daily.    desoximetasone (TOPICORT) 0.05 % cream Apply to pruritic plaques on body daily to BID    fluticasone propionate (FLONASE) 50 mcg/actuation nasal spray 1 spray (50 mcg total) by Each Nostril route once daily.    hydroCHLOROthiazide (MICROZIDE) 12.5 mg capsule Take 12.5 mg by mouth.    levocetirizine (XYZAL) 5 MG tablet Take 1 tablet (5 mg total) by mouth every evening.    levothyroxine (SYNTHROID) 100 MCG tablet Take 1 tablet by mouth every morning.    mometasone (ELOCON) 0.1 % ointment aaa daily to bid prn rash (Patient not taking: Reported on 11/20/2024)    mupirocin (BACTROBAN) 2 % ointment Apply topically 2 (two) times daily.    solifenacin (VESICARE) 10 MG tablet Take 1 tablet (10 mg total) by mouth once daily.     Family History    None       Tobacco Use    Smoking status: Every Day     Types: Cigarettes, Vaping with nicotine    Smokeless tobacco: Never   Substance and Sexual Activity    Alcohol use: Not on file    Drug use: Not on file    Sexual activity: Not on file     Review of Systems   Constitutional:  Negative for chills, fever and unexpected weight change.   HENT:  Negative for rhinorrhea and sore throat.    Respiratory:  Negative for shortness of breath.    Cardiovascular:  Positive for chest pain. Negative for leg swelling.   Gastrointestinal:  Negative for abdominal pain, blood in stool, constipation, diarrhea, nausea and vomiting.   Genitourinary:   Negative for dysuria.   Musculoskeletal:  Negative for myalgias.   Skin: Negative.    Neurological:  Negative for numbness.   Hematological:  Negative for adenopathy.     Objective:     Vital Signs (Most Recent):  Temp: 97.9 °F (36.6 °C) (02/05/25 1833)  Pulse: 60 (02/06/25 0000)  Resp: (!) 23 (02/06/25 0000)  BP: 134/73 (02/06/25 0000)  SpO2: 97 % (02/06/25 0000) Vital Signs (24h Range):  Temp:  [97.9 °F (36.6 °C)] 97.9 °F (36.6 °C)  Pulse:  [60-86] 60  Resp:  [18-23] 23  SpO2:  [95 %-97 %] 97 %  BP: (134-143)/(73-90) 134/73     Weight: 90.7 kg (200 lb)  Body mass index is 35.43 kg/m².     Physical Exam  Constitutional:       General: She is not in acute distress.     Appearance: Normal appearance. She is not ill-appearing, toxic-appearing or diaphoretic.   HENT:      Head: Normocephalic and atraumatic.      Mouth/Throat:      Mouth: Mucous membranes are moist.   Eyes:      General: No scleral icterus.  Neck:      Comments: No JVD/retractions  Cardiovascular:      Rate and Rhythm: Regular rhythm.      Heart sounds: Normal heart sounds. No murmur heard.     No friction rub. No gallop.   Pulmonary:      Effort: Pulmonary effort is normal. No respiratory distress.      Breath sounds: Normal breath sounds.   Abdominal:      General: Bowel sounds are normal. There is no distension.      Palpations: Abdomen is soft. There is no mass.      Tenderness: There is no abdominal tenderness.   Musculoskeletal:      Right lower leg: No edema.      Left lower leg: No edema.   Skin:     General: Skin is warm and dry.      Coloration: Skin is not jaundiced.   Neurological:      Mental Status: She is alert.   Psychiatric:         Behavior: Behavior normal.         Thought Content: Thought content normal.         Judgment: Judgment normal.                Significant studies: Reviewed.

## 2025-02-06 NOTE — PROGRESS NOTES
"[facility] Medicine  Progress Note    Patient Name: Marcelina Deluca  MRN: [id number]  Patient Class: OP- Observation   Admission Date: 2025  Length of Stay: 0 days  Attending Physician: José Turcios MD  Primary Care Provider: Maite Armando FNP        Subjective     Principal Problem:Transaminitis        HPI:  Patient is a 49-year-old female with a history of Graves disease, hypertension, and hypercholesterolemia; she has a family history of premature coronary disease his mother  of a myocardial infarction at age 38  Earlier today, the patient had right chest pain that was constant with a pleuritic component  She went to an outlying ER and developed central chest pain; GI cocktail did not help but the Dilaudid removed both pain focuses although she developed nausea, vomiting, and dizziness  Eventually, she was discharged home on oral Flom but then had recurrence of the right chest pain, thus prompting her to come here; she has no abdominal pain or bowel changes  Patient is afebrile and hemodynamically stable currently  CBC was unremarkable; AST/ALT were 631/247, respectively, with a T bili 1.3; lipase was negative and INR was normal with a normal troponin  EKG showed, per my interpretation, sinus rhythm at 78 beats per minute with T-wave inversion in lead 3  Chest x-ray was unremarkable here; abdominal ultrasound showed, per radiologist Dr. Caden Denise:  "Impression:     Status post cholecystectomy.  Dilated extrahepatic bile duct measuring up to 1.0 cm with mild intrahepatic biliary ductal dilatation.  No prior imaging available for comparison to assess for chronicity of this finding.  Distal common duct obstruction not excluded.  Correlation with appropriate lab values advised.     Diffuse increased echogenicity of the hepatic parenchyma suggesting hepatic steatosis."  She is admitted for further diagnosis, treatment, and care; MRCP is pending    Overview/Hospital Course:  No " notes on file    Interval History:  Patient is seen and examined during multidisciplinary rounds.  Patient is presently NPO and scheduled to undergo ERCP.  Patient reports epigastric abdominal pain.    Review of Systems   Constitutional:  Negative for chills, fever and unexpected weight change.   HENT:  Negative for rhinorrhea and sore throat.    Respiratory:  Negative for shortness of breath.    Cardiovascular:  Positive for chest pain. Negative for leg swelling.   Gastrointestinal:  Negative for abdominal pain, blood in stool, constipation, diarrhea, nausea and vomiting.   Genitourinary:  Negative for dysuria.   Musculoskeletal:  Negative for myalgias.   Skin: Negative.    Neurological:  Negative for numbness.   Hematological:  Negative for adenopathy.     Objective:     Vital Signs (Most Recent):  Temp: 97.9 °F (36.6 °C) (02/06/25 0646)  Pulse: 66 (02/06/25 0646)  Resp: 18 (02/06/25 0243)  BP: 126/72 (02/06/25 0646)  SpO2: 97 % (02/06/25 0646) Vital Signs (24h Range):  Temp:  [97.7 °F (36.5 °C)-97.9 °F (36.6 °C)] 97.9 °F (36.6 °C)  Pulse:  [57-86] 66  Resp:  [17-23] 18  SpO2:  [95 %-97 %] 97 %  BP: (123-143)/(64-90) 126/72     Weight: 92.1 kg (203 lb)  Body mass index is 35.96 kg/m².    Intake/Output Summary (Last 24 hours) at 2/6/2025 0973  Last data filed at 2/6/2025 0245  Gross per 24 hour   Intake --   Output 100 ml   Net -100 ml         Physical Exam  Constitutional:       General: She is not in acute distress.     Appearance: Normal appearance. She is not ill-appearing, toxic-appearing or diaphoretic.   HENT:      Head: Normocephalic and atraumatic.      Mouth/Throat:      Mouth: Mucous membranes are moist.   Eyes:      General: No scleral icterus.  Neck:      Comments: No JVD/retractions  Cardiovascular:      Rate and Rhythm: Regular rhythm.      Heart sounds: Normal heart sounds. No murmur heard.     No friction rub. No gallop.   Pulmonary:      Effort: Pulmonary effort is normal. No respiratory distress.  "     Breath sounds: Normal breath sounds.   Abdominal:      General: Bowel sounds are normal. There is no distension.      Palpations: Abdomen is soft. There is no mass.      Tenderness: There is no abdominal tenderness.   Musculoskeletal:      Right lower leg: No edema.      Left lower leg: No edema.   Skin:     General: Skin is warm and dry.      Coloration: Skin is not jaundiced.   Neurological:      Mental Status: She is alert.   Psychiatric:         Behavior: Behavior normal.         Thought Content: Thought content normal.         Judgment: Judgment normal.             Significant Labs: All pertinent labs within the past 24 hours have been reviewed.  CBC:   Recent Labs   Lab 02/05/25 2139 02/06/25  0308   WBC 5.80 5.33   HGB 13.9 12.6   HCT 41.9 37.6    215     CMP:   Recent Labs   Lab 02/05/25 2139 02/06/25 0307    136   K 3.6 3.4*    104   CO2 26 24   * 92   BUN 14 14   CREATININE 0.9 0.8   CALCIUM 9.3 8.7   PROT 7.2 6.6   ALBUMIN 4.3 4.0   BILITOT 1.3* 0.8   ALKPHOS 289* 239*   * 340*   * 194*   ANIONGAP 8 8     Coagulation:   Recent Labs   Lab 02/06/25  0017   INR 1.0     Lactic Acid: No results for input(s): "LACTATE" in the last 48 hours.  Lipase:   Recent Labs   Lab 02/05/25 2139   LIPASE 7     Troponin:   Recent Labs   Lab 02/05/25 2139   TROPONINIHS 5.4     TSH:   Recent Labs   Lab 02/06/25 0307   TSH 6.960*     Urine Studies: No results for input(s): "COLORU", "APPEARANCEUA", "PHUR", "SPECGRAV", "PROTEINUA", "GLUCUA", "KETONESU", "BILIRUBINUA", "OCCULTUA", "NITRITE", "UROBILINOGEN", "LEUKOCYTESUR", "RBCUA", "WBCUA", "BACTERIA", "SQUAMEPITHEL", "HYALINECASTS" in the last 48 hours.    Invalid input(s): "WRIGHTSUR"    Significant Imaging:   CXR:  When allowing for differences in projection and technique, there is little change in the heart, pulmonary vascularity or regional skeleton. Lungs are hypoinflated with some patchy areas of opacity in the lung bases. " Cardiac silhouette size is within normal limits. The pulmonary vascularity is within normal limits. No pleural effusions or pneumothoraces. No acute mikal abnormality.     CT Chest with contrast:  Small low-density lesion identified left lobe of thyroid. No mediastinal or hilar adenopathy is seen. Basilar interstitial changes bilaterally, no other consolidation. There is no parenchymal nodule or pleural effusion. The bony structures show minimal degenerative changes. Visualized upper abdomen reveals no acute abnormality.     CXR:  The cardiomediastinal silhouette is within normal limits. Mediastinal structures are midline. There is slight asymmetric elevation of the right hemidiaphragm. The lungs are symmetrically expanded with subsegmental atelectasis. No large region of confluent airspace consolidation, substantial volume of pleural fluid or pneumothorax identified. Osseous structures appear intact. Surgical clips project over the right upper quadrant.     RUQ abdominal US:  Status post cholecystectomy.  Dilated extrahepatic bile duct measuring up to 1.0 cm with mild intrahepatic biliary ductal dilatation.  No prior imaging available for comparison to assess for chronicity of this finding.  Distal common duct obstruction not excluded.  Correlation with appropriate lab values advised.  Diffuse increased echogenicity of the hepatic parenchyma suggesting hepatic steatosis.    MRCP:   Prior cholecystectomy with mild intrahepatic and extrahepatic biliary duct dilatation.  There is no focal stenosis or filling defects    Assessment and Plan     * Transaminitis  Follow MRCP and acute hepatitis panel.   Follow GI recommendations.     Graves disease  TSH is elevated 6.9; increase Synthroid 112 mcg daily.     Primary hypertension  Stable  Hold HCTZ and use prn IV Hydralazine    Choledocholithiasis    NPO  Maintenance fluids  Empiric Zosyn  Prn IV Morphine  Prn Zofran  MRCP pending  GI consultation-> may need  ERCP    Discussed with bedside nurse and .  VTE Risk Mitigation (From admission, onward)           Ordered     enoxaparin injection 40 mg  Every 24 hours (non-standard times)         02/06/25 0726     IP VTE HIGH RISK PATIENT  Once         02/06/25 0152     Place sequential compression device  Until discontinued         02/06/25 0152                    Discharge Planning   HEBERT:   February 7, 2025.    Code Status: Full Code   Medical Readiness for Discharge Date:                            José Turcios MD  Department of Hospital Medicine   [facility]

## 2025-02-06 NOTE — PROGRESS NOTES
"Pharmacist Renal Dose Adjustment Note    Marcelina Deluca is a 49 y.o. female being treated with the medication Piperacillin-tazobactam    Patient Data:    Vital Signs (Most Recent):  Temp: 97.9 °F (36.6 °C) (02/05/25 1833)  Pulse: 60 (02/06/25 0000)  Resp: (!) 23 (02/06/25 0000)  BP: 134/73 (02/06/25 0000)  SpO2: 97 % (02/06/25 0000) Vital Signs (72h Range):  Temp:  [97.9 °F (36.6 °C)]   Pulse:  [60-86]   Resp:  [18-23]   BP: (134-143)/(73-90)   SpO2:  [95 %-97 %]        Ht: 5' 3" (1.6 m)  Wt: 90.7 kg (200 lb)  Estimated Creatinine Clearance: 80.8 mL/min (based on SCr of 0.9 mg/dL).  Body mass index is 35.43 kg/m².    Per [facility] renal dosing protocol:     Previous Order: Piperacillin-tazobactam 3.375 g Q6H    Will be changed to:     New Order: Piperacillin-tazobactam 4.5 g Q8H,    Due to: Per Pharmacy Protocol    Renal dose adjustments performed as noted above.    We will continue monitoring and adjusting as necessary.    Pharmacist: Jayy Corbin PharmD  Ext: [phone]      "

## 2025-02-06 NOTE — ASSESSMENT & PLAN NOTE
TSH is elevated 6.9; increase Synthroid 125 mcg daily.  Patient was taking Synthroid 112 mcg daily.

## 2025-02-07 VITALS
SYSTOLIC BLOOD PRESSURE: 112 MMHG | WEIGHT: 203 LBS | TEMPERATURE: 98 F | OXYGEN SATURATION: 95 % | BODY MASS INDEX: 35.97 KG/M2 | DIASTOLIC BLOOD PRESSURE: 71 MMHG | RESPIRATION RATE: 16 BRPM | HEART RATE: 60 BPM | HEIGHT: 63 IN

## 2025-02-07 LAB
ALBUMIN SERPL BCP-MCNC: 3.9 G/DL (ref 3.5–5.2)
ALP SERPL-CCNC: 177 U/L (ref 55–135)
ALT SERPL W/O P-5'-P-CCNC: 104 U/L (ref 10–44)
ANION GAP SERPL CALC-SCNC: 4 MMOL/L (ref 8–16)
AST SERPL-CCNC: 71 U/L (ref 10–40)
BASOPHILS # BLD AUTO: 0.01 K/UL (ref 0–0.2)
BASOPHILS NFR BLD: 0.2 % (ref 0–1.9)
BILIRUB SERPL-MCNC: 0.4 MG/DL (ref 0.1–1)
BUN SERPL-MCNC: 12 MG/DL (ref 6–20)
CALCIUM SERPL-MCNC: 8.4 MG/DL (ref 8.7–10.5)
CHLORIDE SERPL-SCNC: 107 MMOL/L (ref 95–110)
CO2 SERPL-SCNC: 26 MMOL/L (ref 23–29)
CREAT SERPL-MCNC: 0.9 MG/DL (ref 0.5–1.4)
DIFFERENTIAL METHOD BLD: ABNORMAL
EOSINOPHIL # BLD AUTO: 0 K/UL (ref 0–0.5)
EOSINOPHIL NFR BLD: 0.2 % (ref 0–8)
ERYTHROCYTE [DISTWIDTH] IN BLOOD BY AUTOMATED COUNT: 12.4 % (ref 11.5–14.5)
EST. GFR  (NO RACE VARIABLE): >60 ML/MIN/1.73 M^2
GLUCOSE SERPL-MCNC: 177 MG/DL (ref 70–110)
HAV IGM SERPL QL IA: NEGATIVE
HBV CORE IGM SERPL QL IA: NEGATIVE
HBV SURFACE AG SERPL QL IA: NEGATIVE
HCT VFR BLD AUTO: 37.8 % (ref 37–48.5)
HCV AB S/CO SERPL IA: NON REACTIVE
HCV AB SERPL QL IA: NORMAL
HGB BLD-MCNC: 12.5 G/DL (ref 12–16)
IMM GRANULOCYTES # BLD AUTO: 0.03 K/UL (ref 0–0.04)
IMM GRANULOCYTES NFR BLD AUTO: 0.5 % (ref 0–0.5)
LYMPHOCYTES # BLD AUTO: 0.7 K/UL (ref 1–4.8)
LYMPHOCYTES NFR BLD: 11.7 % (ref 18–48)
MAGNESIUM SERPL-MCNC: 2.2 MG/DL (ref 1.6–2.6)
MCH RBC QN AUTO: 28.7 PG (ref 27–31)
MCHC RBC AUTO-ENTMCNC: 33.1 G/DL (ref 32–36)
MCV RBC AUTO: 87 FL (ref 82–98)
MONOCYTES # BLD AUTO: 0.3 K/UL (ref 0.3–1)
MONOCYTES NFR BLD: 4.7 % (ref 4–15)
NEUTROPHILS # BLD AUTO: 4.9 K/UL (ref 1.8–7.7)
NEUTROPHILS NFR BLD: 82.7 % (ref 38–73)
NRBC BLD-RTO: 0 /100 WBC
PLATELET # BLD AUTO: 218 K/UL (ref 150–450)
PMV BLD AUTO: 10.7 FL (ref 9.2–12.9)
POTASSIUM SERPL-SCNC: 3.9 MMOL/L (ref 3.5–5.1)
PROT SERPL-MCNC: 6.3 G/DL (ref 6–8.4)
RBC # BLD AUTO: 4.36 M/UL (ref 4–5.4)
SODIUM SERPL-SCNC: 137 MMOL/L (ref 136–145)
WBC # BLD AUTO: 5.92 K/UL (ref 3.9–12.7)

## 2025-02-07 PROCEDURE — 25000003 PHARM REV CODE 250: Performed by: INTERNAL MEDICINE

## 2025-02-07 PROCEDURE — 85025 COMPLETE CBC W/AUTO DIFF WBC: CPT | Performed by: INTERNAL MEDICINE

## 2025-02-07 PROCEDURE — 80053 COMPREHEN METABOLIC PANEL: CPT | Performed by: INTERNAL MEDICINE

## 2025-02-07 PROCEDURE — 63600175 PHARM REV CODE 636 W HCPCS: Performed by: INTERNAL MEDICINE

## 2025-02-07 PROCEDURE — 83735 ASSAY OF MAGNESIUM: CPT | Performed by: INTERNAL MEDICINE

## 2025-02-07 PROCEDURE — 36415 COLL VENOUS BLD VENIPUNCTURE: CPT | Performed by: INTERNAL MEDICINE

## 2025-02-07 RX ORDER — LEVOTHYROXINE SODIUM 125 UG/1
125 TABLET ORAL EVERY MORNING
Qty: 30 TABLET | Refills: 0 | Status: SHIPPED | OUTPATIENT
Start: 2025-02-08 | End: 2026-02-08

## 2025-02-07 RX ORDER — ROSUVASTATIN CALCIUM 10 MG/1
10 TABLET, COATED ORAL DAILY
Start: 2025-02-21

## 2025-02-07 RX ADMIN — LEVOTHYROXINE SODIUM 125 MCG: 0.1 TABLET ORAL at 05:02

## 2025-02-07 RX ADMIN — PIPERACILLIN AND TAZOBACTAM 4.5 G: 4; .5 INJECTION, POWDER, LYOPHILIZED, FOR SOLUTION INTRAVENOUS; PARENTERAL at 02:02

## 2025-02-07 NOTE — SUBJECTIVE & OBJECTIVE
Interval History:  Patient is seen and examined during multidisciplinary rounds.  Patient is presently NPO and scheduled to undergo ERCP.  Patient reports epigastric abdominal pain.    Review of Systems   Constitutional:  Negative for chills, fever and unexpected weight change.   HENT:  Negative for rhinorrhea and sore throat.    Respiratory:  Negative for shortness of breath.    Cardiovascular:  Positive for chest pain. Negative for leg swelling.   Gastrointestinal:  Negative for abdominal pain, blood in stool, constipation, diarrhea, nausea and vomiting.   Genitourinary:  Negative for dysuria.   Musculoskeletal:  Negative for myalgias.   Skin: Negative.    Neurological:  Negative for numbness.   Hematological:  Negative for adenopathy.     Objective:     Vital Signs (Most Recent):  Temp: 98 °F (36.7 °C) (02/07/25 0648)  Pulse: 60 (02/07/25 0648)  Resp: 16 (02/07/25 0302)  BP: 112/71 (02/07/25 0648)  SpO2: 95 % (02/07/25 0648) Vital Signs (24h Range):  Temp:  [97 °F (36.1 °C)-98.1 °F (36.7 °C)] 98 °F (36.7 °C)  Pulse:  [53-74] 60  Resp:  [13-18] 16  SpO2:  [94 %-98 %] 95 %  BP: (111-157)/(68-88) 112/71     Weight: 92.1 kg (203 lb)  Body mass index is 35.96 kg/m².    Intake/Output Summary (Last 24 hours) at 2/7/2025 0740  Last data filed at 2/6/2025 1731  Gross per 24 hour   Intake 620 ml   Output 400 ml   Net 220 ml         Physical Exam  Constitutional:       General: She is not in acute distress.     Appearance: Normal appearance. She is not ill-appearing, toxic-appearing or diaphoretic.   HENT:      Head: Normocephalic and atraumatic.      Mouth/Throat:      Mouth: Mucous membranes are moist.   Eyes:      General: No scleral icterus.  Neck:      Comments: No JVD/retractions  Cardiovascular:      Rate and Rhythm: Regular rhythm.      Heart sounds: Normal heart sounds. No murmur heard.     No friction rub. No gallop.   Pulmonary:      Effort: Pulmonary effort is normal. No respiratory distress.      Breath sounds:  "Normal breath sounds.   Abdominal:      General: Bowel sounds are normal. There is no distension.      Palpations: Abdomen is soft. There is no mass.      Tenderness: There is no abdominal tenderness.   Musculoskeletal:      Right lower leg: No edema.      Left lower leg: No edema.   Skin:     General: Skin is warm and dry.      Coloration: Skin is not jaundiced.   Neurological:      Mental Status: She is alert.   Psychiatric:         Behavior: Behavior normal.         Thought Content: Thought content normal.         Judgment: Judgment normal.             Significant Labs: All pertinent labs within the past 24 hours have been reviewed.  CBC:   Recent Labs   Lab 02/05/25 2139 02/06/25 0308 02/07/25  0502   WBC 5.80 5.33 5.92   HGB 13.9 12.6 12.5   HCT 41.9 37.6 37.8    215 218     CMP:   Recent Labs   Lab 02/05/25 2139 02/06/25 0307 02/07/25  0502    136 137   K 3.6 3.4* 3.9    104 107   CO2 26 24 26   * 92 177*   BUN 14 14 12   CREATININE 0.9 0.8 0.9   CALCIUM 9.3 8.7 8.4*   PROT 7.2 6.6 6.3   ALBUMIN 4.3 4.0 3.9   BILITOT 1.3* 0.8 0.4   ALKPHOS 289* 239* 177*   * 340* 71*   * 194* 104*   ANIONGAP 8 8 4*     Coagulation:   Recent Labs   Lab 02/06/25  0017   INR 1.0     Lactic Acid: No results for input(s): "LACTATE" in the last 48 hours.  Lipase:   Recent Labs   Lab 02/05/25 2139   LIPASE 7     Troponin:   Recent Labs   Lab 02/05/25 2139   TROPONINIHS 5.4     TSH:   Recent Labs   Lab 02/06/25 0307   TSH 6.960*     Urine Studies: No results for input(s): "COLORU", "APPEARANCEUA", "PHUR", "SPECGRAV", "PROTEINUA", "GLUCUA", "KETONESU", "BILIRUBINUA", "OCCULTUA", "NITRITE", "UROBILINOGEN", "LEUKOCYTESUR", "RBCUA", "WBCUA", "BACTERIA", "SQUAMEPITHEL", "HYALINECASTS" in the last 48 hours.    Invalid input(s): "WRIGHTSUR"    Significant Imaging:   CXR:  When allowing for differences in projection and technique, there is little change in the heart, pulmonary vascularity or " regional skeleton. Lungs are hypoinflated with some patchy areas of opacity in the lung bases. Cardiac silhouette size is within normal limits. The pulmonary vascularity is within normal limits. No pleural effusions or pneumothoraces. No acute mikal abnormality.     CT Chest with contrast:  Small low-density lesion identified left lobe of thyroid. No mediastinal or hilar adenopathy is seen. Basilar interstitial changes bilaterally, no other consolidation. There is no parenchymal nodule or pleural effusion. The bony structures show minimal degenerative changes. Visualized upper abdomen reveals no acute abnormality.     CXR:  The cardiomediastinal silhouette is within normal limits. Mediastinal structures are midline. There is slight asymmetric elevation of the right hemidiaphragm. The lungs are symmetrically expanded with subsegmental atelectasis. No large region of confluent airspace consolidation, substantial volume of pleural fluid or pneumothorax identified. Osseous structures appear intact. Surgical clips project over the right upper quadrant.     RUQ abdominal US:  Status post cholecystectomy.  Dilated extrahepatic bile duct measuring up to 1.0 cm with mild intrahepatic biliary ductal dilatation.  No prior imaging available for comparison to assess for chronicity of this finding.  Distal common duct obstruction not excluded.  Correlation with appropriate lab values advised.  Diffuse increased echogenicity of the hepatic parenchyma suggesting hepatic steatosis.    MRCP:   Prior cholecystectomy with mild intrahepatic and extrahepatic biliary duct dilatation.  There is no focal stenosis or filling defects.    ERCP:  - The entire main bile duct was moderately                          dilated, acquired.                          - The patient has had a cholecystectomy.                          - MIld ampullary stenosis (resolved after                          sphincterotomy)                          - A biliary  sphincterotomy was performed.                          - The biliary tree was swept. Trace sludge removed.

## 2025-02-07 NOTE — DISCHARGE SUMMARY
"[facility] Medicine  Discharge Summary      Patient Name: Marcelina Deluca  MRN: [id number]  NANNETTE: [id number]  Patient Class: IP- Inpatient  Admission Date: 2025  Hospital Length of Stay: 1 days  Discharge Date and Time:  2025 8:56 AM  Attending Physician: José Turcios MD   Discharging Provider: José Turcios MD  Primary Care Provider: Maite Armando FNP    Primary Care Team: Networked reference to record PCT     HPI:   Patient is a 49-year-old female with a history of Graves disease, hypertension, and hypercholesterolemia; she has a family history of premature coronary disease his mother  of a myocardial infarction at age 38  Earlier today, the patient had right chest pain that was constant with a pleuritic component  She went to an outlying ER and developed central chest pain; GI cocktail did not help but the Dilaudid removed both pain focuses although she developed nausea, vomiting, and dizziness  Eventually, she was discharged home on oral [place] but then had recurrence of the right chest pain, thus prompting her to come here; she has no abdominal pain or bowel changes  Patient is afebrile and hemodynamically stable currently  CBC was unremarkable; AST/ALT were 631/247, respectively, with a T bili 1.3; lipase was negative and INR was normal with a normal troponin  EKG showed, per my interpretation, sinus rhythm at 78 beats per minute with T-wave inversion in lead 3  Chest x-ray was unremarkable here; abdominal ultrasound showed, per radiologist Dr. Caden Denise:  "Impression:     Status post cholecystectomy.  Dilated extrahepatic bile duct measuring up to 1.0 cm with mild intrahepatic biliary ductal dilatation.  No prior imaging available for comparison to assess for chronicity of this finding.  Distal common duct obstruction not excluded.  Correlation with appropriate lab values advised.     Diffuse increased echogenicity of the hepatic parenchyma suggesting hepatic " "steatosis."  She is admitted for further diagnosis, treatment, and care; MRCP is pending    Procedure(s) (LRB):  ERCP (ENDOSCOPIC RETROGRADE CHOLANGIOPANCREATOGRAPHY) (N/A)      Hospital Course:   Patient admitted to Hospital Medicine.  Patient maintained on intravenous Zosyn antibiotic therapy.  Patient made NPO.  Patient underwent MRCP.  Patient was evaluated by GI specialist who performed ERCP.  Bile duct was moderately dilated with mild ampullary stenosis.  Biliary sphincterotomy performed.  Biliary tree was swept and trace sludge removed.  Patient tolerated procedure well.  Patient has been cleared for discharge by GI specialist.  Patient instructed to hold statin therapy until liver enzymes normalized.  Patient will have a surveillance LFTs checked next week.  Patient will continue close follow-up with her providers as outpatient.  Discharge plan of care reviewed with the patient who voiced understanding.    Goals of Care Treatment Preferences:  Code Status: Full Code      SDOH Screening:  The patient was screened for utility difficulties, food insecurity, transport difficulties, housing insecurity, and interpersonal safety and there were no concerns identified this admission.     Consults:   Consults (From admission, onward)          Status Ordering Provider     Inpatient consult to Gastroenterology  Once        Provider:  Eliseo Martin III, MD    Completed APARNA SERRATO            * Transaminitis  MRCP results reviewed, follow acute hepatitis panel.   Follow GI recommendations.  Scheduled for ERCP today.    Graves disease  TSH is elevated 6.9; increase Synthroid 125 mcg daily.  Patient was taking Synthroid 112 mcg daily.      Primary hypertension  Stable  Hold HCTZ and use prn IV Hydralazine    Choledocholithiasis    NPO  Maintenance fluids  Empiric Zosyn  Prn IV Morphine  Prn Zofran  MRCP results reviewed.  Follow ERCP results.    CXR:  When allowing for differences in projection and technique, there " is little change in the heart, pulmonary vascularity or regional skeleton. Lungs are hypoinflated with some patchy areas of opacity in the lung bases. Cardiac silhouette size is within normal limits. The pulmonary vascularity is within normal limits. No pleural effusions or pneumothoraces. No acute mikal abnormality.      CT Chest with contrast:  Small low-density lesion identified left lobe of thyroid. No mediastinal or hilar adenopathy is seen. Basilar interstitial changes bilaterally, no other consolidation. There is no parenchymal nodule or pleural effusion. The bony structures show minimal degenerative changes. Visualized upper abdomen reveals no acute abnormality.      CXR:  The cardiomediastinal silhouette is within normal limits. Mediastinal structures are midline. There is slight asymmetric elevation of the right hemidiaphragm. The lungs are symmetrically expanded with subsegmental atelectasis. No large region of confluent airspace consolidation, substantial volume of pleural fluid or pneumothorax identified. Osseous structures appear intact. Surgical clips project over the right upper quadrant.      RUQ abdominal US:  Status post cholecystectomy.  Dilated extrahepatic bile duct measuring up to 1.0 cm with mild intrahepatic biliary ductal dilatation.  No prior imaging available for comparison to assess for chronicity of this finding.  Distal common duct obstruction not excluded.  Correlation with appropriate lab values advised.  Diffuse increased echogenicity of the hepatic parenchyma suggesting hepatic steatosis.     MRCP:   Prior cholecystectomy with mild intrahepatic and extrahepatic biliary duct dilatation.  There is no focal stenosis or filling defects.     ERCP:  - The entire main bile duct was moderately                          dilated, acquired.                          - The patient has had a cholecystectomy.                          - MIld ampullary stenosis (resolved after                           sphincterotomy)                          - A biliary sphincterotomy was performed.                          - The biliary tree was swept. Trace sludge removed.     Final Active Diagnoses:    Diagnosis Date Noted POA    PRINCIPAL PROBLEM:  Transaminitis [R74.01] 02/06/2025 Yes    Choledocholithiasis [K80.50] 02/06/2025 Yes    Primary hypertension [I10] 02/06/2025 Yes    Graves disease [E05.00] 02/06/2025 Yes    Postablative hypothyroidism [E89.0] 02/06/2025 Yes      Problems Resolved During this Admission:       Discharged Condition: good    Disposition: Home or Self Care    Follow Up:   Follow-up Information       Maite Armando FNP. Go on 2/11/2025.    Specialties: Emergency Medicine, Family Medicine  Why: hospital follow up appointment scheduled at 8:40 AM  Contact information:  [address] [place] [address]  [place] [address]  [phone]               Eliseo Martin III, MD Follow up.    Specialty: Gastroenterology  Why: case management unable to schedule - please call clinic to schedule hospital follow up appointment  Contact information:  [address] [facility] [address]  [phone]                           Patient Instructions:      Comprehensive metabolic panel   Standing Status: Future Standing Exp. Date: 05/08/26     Order Specific Question Answer Comments   Send normal result to authorizing provider's In Basket if patient is active on MyChart: Yes      Diet Cardiac     Notify your health care provider if you experience any of the following:  temperature >100.4     Notify your health care provider if you experience any of the following:  persistent nausea and vomiting or diarrhea     Notify your health care provider if you experience any of the following:  severe uncontrolled pain     Notify your health care provider if you experience any of the following:  redness, tenderness, or signs of infection (pain, swelling, redness, odor or green/yellow discharge around incision site)     Notify  "your health care provider if you experience any of the following:  difficulty breathing or increased cough     Notify your health care provider if you experience any of the following:  severe persistent headache     Notify your health care provider if you experience any of the following:  worsening rash     Notify your health care provider if you experience any of the following:  persistent dizziness, light-headedness, or visual disturbances     Notify your health care provider if you experience any of the following:  increased confusion or weakness     Activity as tolerated   Order Comments: Fall precautions       Significant Diagnostic Studies: Labs: CMP   Recent Labs   Lab 02/05/25 2139 02/06/25  0307 02/07/25  0502    136 137   K 3.6 3.4* 3.9    104 107   CO2 26 24 26   * 92 177*   BUN 14 14 12   CREATININE 0.9 0.8 0.9   CALCIUM 9.3 8.7 8.4*   PROT 7.2 6.6 6.3   ALBUMIN 4.3 4.0 3.9   BILITOT 1.3* 0.8 0.4   ALKPHOS 289* 239* 177*   * 340* 71*   * 194* 104*   ANIONGAP 8 8 4*   , CBC   Recent Labs   Lab 02/05/25 2139 02/06/25  0308 02/07/25  0502   WBC 5.80 5.33 5.92   HGB 13.9 12.6 12.5   HCT 41.9 37.6 37.8    215 218   , INR   Lab Results   Component Value Date    INR 1.0 02/06/2025   , and Lipid Panel No results found for: "CHOL", "HDL", "LDLCALC", "TRIG", "CHOLHDL"    Pending Diagnostic Studies:       None           Medications:  Reconciled Home Medications:      Medication List        CHANGE how you take these medications      levothyroxine 125 MCG tablet  Commonly known as: SYNTHROID  Take 1 tablet (125 mcg total) by mouth every morning.  Start taking on: February 8, 2025  What changed:   medication strength  how much to take     rosuvastatin 10 MG tablet  Commonly known as: CRESTOR  Take 1 tablet (10 mg total) by mouth once daily.  Start taking on: February 21, 2025  What changed: These instructions start on February 21, 2025. If you are unsure what to do until then, " ask your doctor or other care provider.            CONTINUE taking these medications      amLODIPine 5 MG tablet  Commonly known as: NORVASC  Take 5 mg by mouth once daily.     hydroCHLOROthiazide 12.5 mg capsule  Commonly known as: MICROZIDE  Take 12.5 mg by mouth once daily.     ibuprofen 600 MG tablet  Commonly known as: ADVIL,MOTRIN  Take 600 mg by mouth every 6 (six) hours as needed for Temperature greater than or Pain.     solifenacin 10 MG tablet  Commonly known as: VESICARE  Take 1 tablet (10 mg total) by mouth once daily.            ASK your doctor about these medications      fluticasone propionate 50 mcg/actuation nasal spray  Commonly known as: FLONASE  1 spray (50 mcg total) by Each Nostril route once daily.     levocetirizine 5 MG tablet  Commonly known as: XYZAL  Take 1 tablet (5 mg total) by mouth every evening.     ondansetron 4 MG Tbdl  Commonly known as: ZOFRAN-ODT  Take 4 mg by mouth every 6 (six) hours as needed.     pantoprazole 40 MG tablet  Commonly known as: PROTONIX  Take 40 mg by mouth once daily.     sucralfate 1 gram tablet  Commonly known as: CARAFATE  Take 1 g by mouth 4 (four) times daily.     traMADoL 50 mg tablet  Commonly known as: ULTRAM  Take 50 mg by mouth every 6 (six) hours as needed.              Indwelling Lines/Drains at time of discharge:   Lines/Drains/Airways       None                   Time spent on the discharge of patient: 33 minutes         José Turcios MD  Department of Hospital Medicine  [facility]

## 2025-02-07 NOTE — PLAN OF CARE
Discharge orders and chart reviewed. No other discharge needs noted at this time. Pt is clear for discharge from case management, after seen by hospital medicine. Pt is discharging to home.    PCP appointment scheduled and added to AVS    Children to transport patient home     02/07/25 0866   Final Note   Assessment Type Final Discharge Note   Anticipated Discharge Disposition Home   What phone number can be called within the next 1-3 days to see how you are doing after discharge? [phone]   Hospital Resources/Appts/Education Provided Appointments scheduled and added to AVS   Post-Acute Status   Discharge Delays (!) Waiting for Provider to Speak to Patient

## 2025-02-07 NOTE — PLAN OF CARE
Hospital follow up appointment scheduled within 7 days per PCP clinic request     02/07/25 5498   Final Note   Hospital Resources/Appts/Education Provided Appointment suggestion unavailable;Appointments scheduled and added to AVS

## 2025-02-08 LAB
OHS QRS DURATION: 70 MS
OHS QTC CALCULATION: 430 MS

## 2025-02-09 ENCOUNTER — HOSPITAL ENCOUNTER (EMERGENCY)
Facility: HOSPITAL | Age: 50
Discharge: HOME | End: 2025-02-09
Attending: EMERGENCY MEDICINE
Payer: COMMERCIAL

## 2025-02-09 VITALS
HEART RATE: 62 BPM | SYSTOLIC BLOOD PRESSURE: 125 MMHG | HEIGHT: 63 IN | TEMPERATURE: 98 F | WEIGHT: 200 LBS | RESPIRATION RATE: 20 BRPM | DIASTOLIC BLOOD PRESSURE: 76 MMHG | OXYGEN SATURATION: 97 % | BODY MASS INDEX: 35.44 KG/M2

## 2025-02-09 DIAGNOSIS — R07.9 CHEST PAIN: ICD-10-CM

## 2025-02-09 PROBLEM — N92.0 MENORRHAGIA: Status: ACTIVE | Noted: 2024-09-17

## 2025-02-09 PROBLEM — I10 ESSENTIAL HYPERTENSION: Status: ACTIVE | Noted: 2025-01-19

## 2025-02-09 PROBLEM — E03.9 ACQUIRED HYPOTHYROIDISM: Status: ACTIVE | Noted: 2017-06-26

## 2025-02-09 PROBLEM — N32.81 OVERACTIVE BLADDER: Status: ACTIVE | Noted: 2020-10-26

## 2025-02-09 LAB
ALBUMIN SERPL BCP-MCNC: 4 G/DL (ref 3.5–5.2)
ALLENS TEST: ABNORMAL
ALP SERPL-CCNC: 133 U/L (ref 55–135)
ALT SERPL W/O P-5'-P-CCNC: 42 U/L (ref 10–44)
ANION GAP SERPL CALC-SCNC: 12 MMOL/L (ref 8–16)
AST SERPL-CCNC: 19 U/L (ref 10–40)
B-HCG UR QL: NEGATIVE
BACTERIA #/AREA URNS HPF: ABNORMAL /HPF
BASOPHILS # BLD AUTO: 0.04 K/UL (ref 0–0.2)
BASOPHILS NFR BLD: 0.7 % (ref 0–1.9)
BILIRUB SERPL-MCNC: 0.3 MG/DL (ref 0.1–1)
BILIRUB UR QL STRIP: NEGATIVE
BNP SERPL-MCNC: 19 PG/ML (ref 0–99)
BUN SERPL-MCNC: 13 MG/DL (ref 6–20)
CALCIUM SERPL-MCNC: 8.8 MG/DL (ref 8.7–10.5)
CHLORIDE SERPL-SCNC: 103 MMOL/L (ref 95–110)
CK SERPL-CCNC: 42 U/L (ref 20–180)
CLARITY UR: ABNORMAL
CO2 SERPL-SCNC: 25 MMOL/L (ref 23–29)
COLOR UR: ABNORMAL
CREAT SERPL-MCNC: 0.9 MG/DL (ref 0.5–1.4)
CTP QC/QA: YES
DELSYS: ABNORMAL
DIFFERENTIAL METHOD BLD: NORMAL
EOSINOPHIL # BLD AUTO: 0.1 K/UL (ref 0–0.5)
EOSINOPHIL NFR BLD: 1.3 % (ref 0–8)
ERYTHROCYTE [DISTWIDTH] IN BLOOD BY AUTOMATED COUNT: 12.7 % (ref 11.5–14.5)
EST. GFR  (NO RACE VARIABLE): >60 ML/MIN/1.73 M^2
GLUCOSE SERPL-MCNC: 120 MG/DL (ref 70–110)
GLUCOSE UR QL STRIP: NEGATIVE
HCO3 UR-SCNC: 25 MMOL/L (ref 24–28)
HCT VFR BLD AUTO: 42.4 % (ref 37–48.5)
HGB BLD-MCNC: 13.7 G/DL (ref 12–16)
HGB UR QL STRIP: ABNORMAL
HIV 1+2 AB+HIV1 P24 AG SERPL QL IA: NEGATIVE
HYALINE CASTS #/AREA URNS LPF: 0 /LPF
IMM GRANULOCYTES # BLD AUTO: 0.02 K/UL (ref 0–0.04)
IMM GRANULOCYTES NFR BLD AUTO: 0.4 % (ref 0–0.5)
KETONES UR QL STRIP: NEGATIVE
LEUKOCYTE ESTERASE UR QL STRIP: ABNORMAL
LIPASE SERPL-CCNC: 10 U/L (ref 4–60)
LYMPHOCYTES # BLD AUTO: 1.5 K/UL (ref 1–4.8)
LYMPHOCYTES NFR BLD: 27 % (ref 18–48)
MAGNESIUM SERPL-MCNC: 2.1 MG/DL (ref 1.6–2.6)
MCH RBC QN AUTO: 28.4 PG (ref 27–31)
MCHC RBC AUTO-ENTMCNC: 32.3 G/DL (ref 32–36)
MCV RBC AUTO: 88 FL (ref 82–98)
MICROSCOPIC COMMENT: ABNORMAL
MODE: ABNORMAL
MONOCYTES # BLD AUTO: 0.4 K/UL (ref 0.3–1)
MONOCYTES NFR BLD: 6.9 % (ref 4–15)
NEUTROPHILS # BLD AUTO: 3.4 K/UL (ref 1.8–7.7)
NEUTROPHILS NFR BLD: 63.7 % (ref 38–73)
NITRITE UR QL STRIP: NEGATIVE
NRBC BLD-RTO: 0 /100 WBC
OHS QRS DURATION: 68 MS
OHS QTC CALCULATION: 407 MS
PCO2 BLDA: 37.5 MMHG (ref 35–45)
PH SMN: 7.43 [PH] (ref 7.35–7.45)
PH UR STRIP: 8 [PH] (ref 5–8)
PLATELET # BLD AUTO: 245 K/UL (ref 150–450)
PMV BLD AUTO: 10.2 FL (ref 9.2–12.9)
PO2 BLDA: 64 MMHG (ref 80–100)
POC BE: 1 MMOL/L
POC SATURATED O2: 93 % (ref 95–100)
POC TCO2: 26 MMOL/L (ref 23–27)
POTASSIUM SERPL-SCNC: 3.5 MMOL/L (ref 3.5–5.1)
PROT SERPL-MCNC: 6.5 G/DL (ref 6–8.4)
PROT UR QL STRIP: ABNORMAL
RBC # BLD AUTO: 4.82 M/UL (ref 4–5.4)
RBC #/AREA URNS HPF: >100 /HPF (ref 0–4)
SAMPLE: ABNORMAL
SITE: ABNORMAL
SODIUM SERPL-SCNC: 140 MMOL/L (ref 136–145)
SP GR UR STRIP: 1.02 (ref 1–1.03)
SQUAMOUS #/AREA URNS HPF: 43 /HPF
T4 FREE SERPL-MCNC: 1.03 NG/DL (ref 0.71–1.51)
TROPONIN I SERPL HS-MCNC: 3.7 PG/ML (ref 0–14.9)
TROPONIN I SERPL HS-MCNC: 3.9 PG/ML (ref 0–14.9)
TROPONIN I SERPL HS-MCNC: 4 PG/ML (ref 0–14.9)
TSH SERPL DL<=0.005 MIU/L-ACNC: 8.98 UIU/ML (ref 0.34–5.6)
URN SPEC COLLECT METH UR: ABNORMAL
UROBILINOGEN UR STRIP-ACNC: NEGATIVE EU/DL
WBC # BLD AUTO: 5.4 K/UL (ref 3.9–12.7)
WBC #/AREA URNS HPF: 32 /HPF (ref 0–5)

## 2025-02-09 PROCEDURE — 82550 ASSAY OF CK (CPK): CPT | Performed by: EMERGENCY MEDICINE

## 2025-02-09 PROCEDURE — 36415 COLL VENOUS BLD VENIPUNCTURE: CPT | Performed by: EMERGENCY MEDICINE

## 2025-02-09 PROCEDURE — 87086 URINE CULTURE/COLONY COUNT: CPT | Performed by: EMERGENCY MEDICINE

## 2025-02-09 PROCEDURE — 36415 COLL VENOUS BLD VENIPUNCTURE: CPT | Performed by: NURSE PRACTITIONER

## 2025-02-09 PROCEDURE — 94761 N-INVAS EAR/PLS OXIMETRY MLT: CPT | Mod: XB

## 2025-02-09 PROCEDURE — 99900035 HC TECH TIME PER 15 MIN (STAT)

## 2025-02-09 PROCEDURE — 93005 ELECTROCARDIOGRAM TRACING: CPT | Performed by: INTERNAL MEDICINE

## 2025-02-09 PROCEDURE — 84439 ASSAY OF FREE THYROXINE: CPT | Performed by: EMERGENCY MEDICINE

## 2025-02-09 PROCEDURE — 83735 ASSAY OF MAGNESIUM: CPT | Performed by: NURSE PRACTITIONER

## 2025-02-09 PROCEDURE — 83880 ASSAY OF NATRIURETIC PEPTIDE: CPT | Performed by: NURSE PRACTITIONER

## 2025-02-09 PROCEDURE — 96361 HYDRATE IV INFUSION ADD-ON: CPT

## 2025-02-09 PROCEDURE — 96374 THER/PROPH/DIAG INJ IV PUSH: CPT

## 2025-02-09 PROCEDURE — 82803 BLOOD GASES ANY COMBINATION: CPT

## 2025-02-09 PROCEDURE — 83690 ASSAY OF LIPASE: CPT | Performed by: EMERGENCY MEDICINE

## 2025-02-09 PROCEDURE — 93010 ELECTROCARDIOGRAM REPORT: CPT | Mod: ,,, | Performed by: INTERNAL MEDICINE

## 2025-02-09 PROCEDURE — 63600175 PHARM REV CODE 636 W HCPCS: Performed by: EMERGENCY MEDICINE

## 2025-02-09 PROCEDURE — 84484 ASSAY OF TROPONIN QUANT: CPT | Mod: 91 | Performed by: NURSE PRACTITIONER

## 2025-02-09 PROCEDURE — 84443 ASSAY THYROID STIM HORMONE: CPT | Performed by: EMERGENCY MEDICINE

## 2025-02-09 PROCEDURE — 87389 HIV-1 AG W/HIV-1&-2 AB AG IA: CPT | Performed by: EMERGENCY MEDICINE

## 2025-02-09 PROCEDURE — 80053 COMPREHEN METABOLIC PANEL: CPT | Performed by: NURSE PRACTITIONER

## 2025-02-09 PROCEDURE — 99285 EMERGENCY DEPT VISIT HI MDM: CPT | Mod: 25

## 2025-02-09 PROCEDURE — 25000003 PHARM REV CODE 250: Performed by: EMERGENCY MEDICINE

## 2025-02-09 PROCEDURE — 94799 UNLISTED PULMONARY SVC/PX: CPT

## 2025-02-09 PROCEDURE — 85025 COMPLETE CBC W/AUTO DIFF WBC: CPT | Performed by: NURSE PRACTITIONER

## 2025-02-09 PROCEDURE — 81025 URINE PREGNANCY TEST: CPT | Performed by: EMERGENCY MEDICINE

## 2025-02-09 PROCEDURE — 81001 URINALYSIS AUTO W/SCOPE: CPT | Performed by: EMERGENCY MEDICINE

## 2025-02-09 PROCEDURE — 36600 WITHDRAWAL OF ARTERIAL BLOOD: CPT

## 2025-02-09 PROCEDURE — 25500020 PHARM REV CODE 255: Performed by: EMERGENCY MEDICINE

## 2025-02-09 PROCEDURE — 84484 ASSAY OF TROPONIN QUANT: CPT | Mod: 91 | Performed by: EMERGENCY MEDICINE

## 2025-02-09 RX ORDER — CEFUROXIME AXETIL 500 MG/1
500 TABLET ORAL 2 TIMES DAILY
Qty: 20 TABLET | Refills: 0 | Status: SHIPPED | OUTPATIENT
Start: 2025-02-09 | End: 2025-02-19

## 2025-02-09 RX ORDER — CEFTRIAXONE 1 G/1
1 INJECTION, POWDER, FOR SOLUTION INTRAMUSCULAR; INTRAVENOUS
Status: COMPLETED | OUTPATIENT
Start: 2025-02-09 | End: 2025-02-09

## 2025-02-09 RX ORDER — LANOLIN ALCOHOL/MO/W.PET/CERES
1 CREAM (GRAM) TOPICAL
COMMUNITY

## 2025-02-09 RX ADMIN — SODIUM CHLORIDE 1000 ML: 9 INJECTION, SOLUTION INTRAVENOUS at 12:02

## 2025-02-09 RX ADMIN — IOHEXOL 100 ML: 350 INJECTION, SOLUTION INTRAVENOUS at 01:02

## 2025-02-09 RX ADMIN — CEFTRIAXONE 1 G: 1 INJECTION, POWDER, FOR SOLUTION INTRAMUSCULAR; INTRAVENOUS at 02:02

## 2025-02-09 NOTE — Clinical Note
"Marcelina Bloomkimberly Deluca was seen and treated in our emergency department on 2/9/2025.  She may return to work on 02/28/2025.  NO WORK UNTIL RELEASED BY PRIMARY CARE MD.     If you have any questions or concerns, please don't hesitate to call.      NEWTON MCCLELLAN RN    "

## 2025-02-09 NOTE — DISCHARGE INSTRUCTIONS
Please read and follow discharge instructions and return precautions.  Rest, avoid any strenuous activity, over exertion or overheating.  Keep well hydrated.  Incentive spirometer use every 2-3 hours while awake.  Take deep breaths frequently.    Ceftin as directed until completed.  Important to follow up closely outpatient with your primary care provider as well as Cardiology.  Tylenol or ibuprofen over-the-counter as directed if needed for pain.  Return immediately if you develop new or worsening symptoms or if you have new problems or concerns.

## 2025-02-09 NOTE — ED PROVIDER NOTES
Encounter Date: 2/9/2025       History     Chief Complaint   Patient presents with    Chest Pain     Started this am, sharp, radiating to back. Was seen here x1 week ago for chest pain     49-year-old female presents complaining of chest pain.  Patient reports intermittent episodes of right upper anterior chest pain.  The pain is not exertional in nature. The pain is sharp. Has been present at times with deep breathing but denies feeling shortness of breath.  Patient was recently admitted to the hospital for similar chest pain.  At that time she was found to have a dilated common bile duct and underwent an ERCP/sphincterotomy.  She has been home and doing well until this pain reoccurred.  She does state she was having this pain at that time when she was admitted to the hospital.  The pain has not suddenly changed or worsened but had improved after discharge.  Denies any severe pain or discomfort.  Denies fever chills cough or cold symptoms.  Denies any headache or visual changes.  Denies any focal weakness numbness tingling or paresthesias.  Has had no associated lower extremity pain or swelling.  The pain is not exertional in nature.  No associated arm or neck pain or discomfort.  She states it at times feels like it may be radiating somewhat into the back area.        Review of patient's allergies indicates:   Allergen Reactions    Opioids - morphine analogues Palpitations and Other (See Comments)     Increased chest pain     Past Medical History:   Diagnosis Date    Thyroid disease      Past Surgical History:   Procedure Laterality Date    ERCP N/A 2/6/2025    Procedure: ERCP (ENDOSCOPIC RETROGRADE CHOLANGIOPANCREATOGRAPHY);  Surgeon: Eliseo Martin III, MD;  Location: [facility];  Service: Endoscopy;  Laterality: N/A;     Family History   Problem Relation Name Age of Onset    Melanoma Neg Hx       Social History     Tobacco Use    Smoking status: Former     Current packs/day: 0.00     Average packs/day: 1  pack/day for 28.0 years (28.0 ttl pk-yrs)     Types: Cigarettes, Vaping with nicotine     Start date:      Quit date:      Years since quittin.1    Smokeless tobacco: Never     Review of Systems   Constitutional: Negative.  Negative for activity change, appetite change, chills, fatigue and fever.   HENT: Negative.  Negative for congestion, sore throat and trouble swallowing.    Eyes: Negative.  Negative for photophobia, pain and visual disturbance.   Respiratory: Negative.  Negative for cough, shortness of breath and wheezing.    Cardiovascular:  Positive for chest pain. Negative for palpitations and leg swelling.   Gastrointestinal: Negative.  Negative for abdominal distention, abdominal pain, blood in stool, constipation, diarrhea, nausea and vomiting.   Endocrine: Negative.    Genitourinary: Negative.  Negative for decreased urine volume, difficulty urinating, dysuria, flank pain, frequency and urgency.   Musculoskeletal: Negative.  Negative for arthralgias, back pain, myalgias and neck pain.   Skin: Negative.  Negative for rash.   Neurological: Negative.  Negative for dizziness, syncope, facial asymmetry, speech difficulty, weakness, light-headedness, numbness and headaches.   Hematological:  Does not bruise/bleed easily.   Psychiatric/Behavioral: Negative.  Negative for confusion.    All other systems reviewed and are negative.      Physical Exam     Initial Vitals [25 0948]   BP Pulse Resp Temp SpO2   (!) 141/84 70 16 97.8 °F (36.6 °C) 98 %      MAP       --         Physical Exam    Nursing note and vitals reviewed.  Constitutional: She is cooperative. She does not appear ill. No distress.   HENT:   Head: Normocephalic and atraumatic.   Nose: Nose normal. Mouth/Throat: Uvula is midline, oropharynx is clear and moist and mucous membranes are normal. No oropharyngeal exudate, posterior oropharyngeal edema or posterior oropharyngeal erythema.   Eyes: Conjunctivae, EOM and lids are normal. Pupils  are equal, round, and reactive to light.   Neck: Trachea normal and phonation normal. Neck supple. No stridor present. No JVD present.   Normal range of motion.   Full passive range of motion without pain.     Cardiovascular:  Normal rate, regular rhythm, normal heart sounds, intact distal pulses and normal pulses.     Exam reveals no distant heart sounds and no decreased pulses.       No murmur heard.  Pulmonary/Chest: Effort normal and breath sounds normal. No respiratory distress. She has no wheezes. She has no rhonchi. She has no rales.   Abdominal: Abdomen is soft. Bowel sounds are normal. She exhibits no distension and no mass. There is no abdominal tenderness.   No right CVA tenderness.  No left CVA tenderness. There is no rigidity.   Musculoskeletal:         General: No tenderness or edema. Normal range of motion.      Right hand: Normal. Normal capillary refill. Normal pulse.      Left hand: Normal. Normal sensation. Normal capillary refill. Normal pulse.      Cervical back: Normal, full passive range of motion without pain, normal range of motion and neck supple. No bony tenderness. No pain with movement or spinous process tenderness. Normal range of motion.      Thoracic back: Normal. No tenderness. Normal range of motion.      Lumbar back: Normal. No tenderness. Normal range of motion.      Right lower leg: Normal. No swelling or tenderness.      Left lower leg: Normal. No swelling or tenderness.      Right foot: Normal. Normal capillary refill. Normal pulse.      Left foot: Normal. Normal capillary refill. Normal pulse.      Comments: Pulses 2+ throughout, no extremity abnormalities     Neurological: She is alert and oriented to person, place, and time. She has normal strength. No cranial nerve deficit or sensory deficit. Coordination and gait normal. GCS score is 15. GCS eye subscore is 4. GCS verbal subscore is 5. GCS motor subscore is 6.   No focal deficits   Skin: Skin is warm, dry and intact.  Capillary refill takes less than 2 seconds. No ecchymosis, no petechiae and no rash noted. No erythema. No pallor.   Psychiatric: She has a normal mood and affect. Her speech is normal and behavior is normal.         ED Course   Procedures  Labs Reviewed   COMPREHENSIVE METABOLIC PANEL - Abnormal       Result Value    Sodium 140      Potassium 3.5      Chloride 103      CO2 25      Glucose 120 (*)     BUN 13      Creatinine 0.9      Calcium 8.8      Total Protein 6.5      Albumin 4.0      Total Bilirubin 0.3      Alkaline Phosphatase 133      AST 19      ALT 42      eGFR >60.0      Anion Gap 12      Narrative:     Release to patient->Immediate   URINALYSIS, REFLEX TO URINE CULTURE - Abnormal    Specimen UA Urine, Clean Catch      Color, UA Orange (*)     Appearance, UA Hazy (*)     pH, UA 8.0      Specific Gravity, UA 1.025      Protein, UA 1+ (*)     Glucose, UA Negative      Ketones, UA Negative      Bilirubin (UA) Negative      Occult Blood UA 3+ (*)     Nitrite, UA Negative      Urobilinogen, UA Negative      Leukocytes, UA 3+ (*)     Narrative:     Specimen Source->Urine   TSH - Abnormal    TSH 8.978 (*)    URINALYSIS MICROSCOPIC - Abnormal    RBC, UA >100 (*)     WBC, UA 32 (*)     Bacteria Rare      Squam Epithel, UA 43      Hyaline Casts, UA 0      Microscopic Comment SEE COMMENT      Narrative:     Specimen Source->Urine   ISTAT PROCEDURE - Abnormal    POC PH 7.432      POC PCO2 37.5      POC PO2 64 (*)     POC HCO3 25.0      POC BE 1      POC SATURATED O2 93      POC TCO2 26      Sample ARTERIAL      Site RR      Allens Test Pass      DelSys Room Air      Mode SPONT     CULTURE, URINE   MAGNESIUM    Magnesium 2.1      Narrative:     Release to patient->Immediate   CBC W/ AUTO DIFFERENTIAL    WBC 5.40      RBC 4.82      Hemoglobin 13.7      Hematocrit 42.4      MCV 88      MCH 28.4      MCHC 32.3      RDW 12.7      Platelets 245      MPV 10.2      Immature Granulocytes 0.4      Gran # (ANC) 3.4       Immature Grans (Abs) 0.02      Lymph # 1.5      Mono # 0.4      Eos # 0.1      Baso # 0.04      nRBC 0      Gran % 63.7      Lymph % 27.0      Mono % 6.9      Eosinophil % 1.3      Basophil % 0.7      Differential Method Automated      Narrative:     Release to patient->Immediate   TROPONIN I HIGH SENSITIVITY    Troponin I High Sensitivity 3.7      Narrative:     Release to patient->Immediate   B-TYPE NATRIURETIC PEPTIDE    BNP 19      Narrative:     Release to patient->Immediate   LIPASE   CK   TSH   LIPASE    Lipase 10     CK    CPK 42     TROPONIN I HIGH SENSITIVITY    Troponin I High Sensitivity 4.0     T4, FREE    Free T4 1.03     TROPONIN I HIGH SENSITIVITY    Troponin I High Sensitivity 3.9     HIV 1 / 2 ANTIBODY   POCT URINE PREGNANCY    POC Preg Test, Ur Negative       Acceptable Yes          ECG Results              EKG 12-lead (Final result)        Collection Time Result Time QRS Duration OHS QTC Calculation    02/09/25 09:46:46 02/09/25 13:28:48 68 407                     Final result by Interface, Lab In [facility] (02/09/25 13:28:51)                   Narrative:    Test Reason : R07.9,    Vent. Rate :  65 BPM     Atrial Rate :  65 BPM     P-R Int : 142 ms          QRS Dur :  68 ms      QT Int : 392 ms       P-R-T Axes :  69  61  61 degrees    QTcB Int : 407 ms    Normal sinus rhythm with sinus arrhythmia  Low voltage QRS  Borderline Abnormal ECG  No previous ECGs available  Confirmed by Jack Coombs ([id number]) on 2/9/2025 1:28:47 PM    Referred By:            Confirmed By: Jack Coombs                                  Imaging Results              CT Abdomen Pelvis With IV Contrast NO Oral Contrast (Final result)  Result time 02/09/25 14:02:05      Final result by Cale Beltran MD (02/09/25 14:02:05)                   Impression:      1. No acute CT abnormalities.      Electronically signed by: Cale Beltran  Date:    02/09/2025  Time:    14:02               Narrative:     EXAMINATION:  CT ABDOMEN PELVIS WITH IV CONTRAST    CLINICAL HISTORY:  Epigastric pain;.    TECHNIQUE:  Post infusion axial images were obtained from the lung bases to the pubic symphysis 100 cc nonionic contrast was utilized for the examination.    COMPARISON:  None.    FINDINGS:  The liver is normal with the exception of a subcentimeter cyst in the left hepatic lobe.  The gallbladder is absent.  Biliary tree is nondilated.  The spleen, pancreas, adrenal glands, and kidneys have a normal appearance.  The abdominal aorta is normal in caliber with mild atherosclerotic calcification.    There is no pathologic bowel wall thickening or evidence of obstruction.  The appendix is visualized and is normal.    Images of the pelvis demonstrate a normal appearing partially filled urinary bladder.  Bowel structures are unremarkable.  There is no significant pelvic free fluid or lymphadenopathy.    No acute osseous abnormalities are identified.                                       CTA Chest Non-Coronary (PE Studies) (Final result)  Result time 02/09/25 13:59:27      Final result by Cale Beltran MD (02/09/25 13:59:27)                   Impression:      1. No evidence of pulmonary thromboembolic disease.  2. Mild bilateral dependent atelectasis.      Electronically signed by: Cale Beltran  Date:    02/09/2025  Time:    13:59               Narrative:    EXAMINATION:  CTA CHEST NON CORONARY (PE STUDIES)    CLINICAL HISTORY:  Pulmonary embolism (PE) suspected, high prob;.    TECHNIQUE:  CT angiography targeted to the pulmonary arteries was performed. 100 cc nonionic contrast was administered and the bolus was timed for optimal opacification of the pulmonary arteries. 3-D reformatted images were obtained on an independent workstation and stored as a part of patient's permanent medical record.    COMPARISON:  Same date chest radiograph    FINDINGS:  Pulmonary arteries are adequately opacified.  There are no filling defects  to indicate pulmonary thromboembolic disease.  Heart size is normal.  The thoracic aorta is normal in caliber.  There is no mediastinal mass or pathologic lymphadenopathy.    Images at lung windows demonstrate mild bilateral dependent atelectasis.  There are no infiltrates or effusions.  No acute osseous abnormalities are demonstrated.                                       X-Ray Chest AP Portable (Final result)  Result time 02/09/25 10:15:04      Final result by Cale Beltran MD (02/09/25 10:15:04)                   Impression:      1. Mild perihilar linear atelectasis on the right.  No other significant findings.      Electronically signed by: Cale Beltran  Date:    02/09/2025  Time:    10:15               Narrative:    EXAMINATION:  XR CHEST AP PORTABLE    CLINICAL HISTORY:  Chest Pain;    COMPARISON:  February 5, 2025    FINDINGS:  Heart size is normal.  The mediastinum is unremarkable.  The left lung is clear.  Perihilar linear atelectasis is noted on the right, with stable mild elevation of the right hemidiaphragm.  No acute osseous abnormalities are identified.                                       Medications   sodium chloride 0.9% bolus 1,000 mL 1,000 mL (0 mLs Intravenous Stopped 2/9/25 1418)   iohexoL (OMNIPAQUE 350) injection 100 mL (100 mLs Intravenous Given 2/9/25 1349)   cefTRIAXone injection 1 g (1 g Intravenous Given 2/9/25 1444)     Medical Decision Making  The patient is currently hemodynamically stable.  Characteristics of chest pain are not suggestive of a cardiac etiology--sharp, intermittent, nonradiating with no associated nausea diaphoresis or shortness of breath.  No palpitations lightheadedness syncope or near-syncope.  Exam is unremarkable.  PE study is negative, no acute intrathoracic abnormality.  As patient with recent sphincterotomy and problems with an enlarged common bile duct CTA of the abdomen pelvis was obtained with no evidence of acute abnormality.  Liver function  tests are normal.  The patient feels well and feels ready to go home.  Explained findings of TSH being elevated--she reports that her physician has recently increased her thyroid medication.  Also explained the finding of a urinary tract infection.  Patient denies fever chills malaise nausea vomiting or flank pain.  Does report that she was recently treated for UTI.  Did explained the importance of close outpatient evaluation regarding this and need for follow up on culture results.  Patient will be treated with Ceftin.  Symptomatic supportive care discussed in detail.  Return precautions discussed in detail including any sudden change or worsening in the chest discomfort, chest discomfort with characteristics suggestive of a cardiac etiology or for any problems or concerns.  Have also placed an ambulatory referral to Cardiology as well as ordering an outpatient stress test.  Patient feels comfortable with discharge planning/shared decision-making has occurred    Amount and/or Complexity of Data Reviewed  Labs: ordered. Decision-making details documented in ED Course.  Radiology: ordered.    Risk  Prescription drug management.               ED Course as of 02/09/25 1637   Sun Feb 09, 2025   1409 Low pulse ox numbers not accurate-- poor wave form, when proper wave form after adjustment pox 100% Ra [AR]   1546 Leukocyte Esterase, UA(!): 3+ [AR]   1546 Blood, UA(!): 3+ [AR]   1546 CPK: 42 [AR]   1546 Free T4: 1.03 [AR]   1546 Sodium: 140 [AR]   1546 Potassium: 3.5 [AR]   1546 Chloride: 103 [AR]   1546 CO2: 25 [AR]   1546 Glucose(!): 120 [AR]   1546 BUN: 13 [AR]   1546 Creatinine: 0.9 [AR]   1546 Calcium: 8.8 [AR]   1546 PROTEIN TOTAL: 6.5 [AR]   1546 Albumin: 4.0 [AR]   1546 BILIRUBIN TOTAL: 0.3 [AR]   1546 ALP: 133 [AR]   1546 AST: 19 [AR]   1546 ALT: 42 [AR]   1546 eGFR: >60.0 [AR]   1546 Anion Gap: 12 [AR]   1546 WBC: 5.40 [AR]   1546 Hemoglobin: 13.7 [AR]   1546 Hematocrit: 42.4 [AR]   1547 Platelet Count: 245 [AR]    1547 Magnesium : 2.1 [AR]   1547 Leukocyte Esterase, UA(!): 3+ [AR]   1547 Blood, UA(!): 3+ [AR]   1547 Protein, UA(!): 1+ [AR]   1547 Appearance, UA(!): Hazy [AR]   1547 Specimen UA: Urine, Clean Catch [AR]   1547 Color, UA(!): Orange [AR]   1547 TSH(!): 8.978 [AR]   1547 RBC, UA(!): >100 [AR]   1547 WBC, UA(!): 32 [AR]   1547 Bacteria, UA: Rare [AR]   1547 Squam Epithel, UA: 43 [AR]   1547 Hyaline Casts, UA: 0 [AR]   1547 POC PCO2: 37.5 [AR]   1547 hCG Qualitative, Urine: Negative [AR]   1547  Acceptable: Yes [AR]   1547 Troponin I High Sensitivity: 3.9 [AR]   1626 Protein, UA(!): 1+ [AR]   1626 Blood, UA(!): 3+ [AR]   1626 Leukocyte Esterase, UA(!): 3+ [AR]   1626 TSH(!): 8.978 [AR]      ED Course User Index  [AR] Nimisha Nash MD                           Clinical Impression:  Final diagnoses:  [R07.9] Chest pain          ED Disposition Condition    Discharge Stable          ED Prescriptions       Medication Sig Dispense Start Date End Date Auth. Provider    cefUROXime (CEFTIN) 500 MG tablet Take 1 tablet (500 mg total) by mouth 2 (two) times a day. for 10 days 20 tablet 2/9/2025 2/19/2025 Nimisha Nash MD          Follow-up Information       Follow up With Specialties Details Why Contact Info    Maite Armando, FNP Emergency Medicine, Family Medicine Schedule an appointment as soon as possible for a visit in 2 days  [address] [facility] [address]  [place] [address]  [phone]      Arthur Vásquez MD Cardiology Schedule an appointment as soon as possible for a visit in 3 days Or see a cardiologist of your choice or as recommended by your insurance plan or primary care provider. [address] [address]  [phone]               Nimisha Nash MD  02/09/25 6448

## 2025-02-09 NOTE — Clinical Note
"Marcelina Bloomkmiberly Deluca was seen and treated in our emergency department on 2/9/2025.  She may return to work on 02/28/2025.  NO WORK UNTIL RELEASED BY PRIMARY CARE MD.     If you have any questions or concerns, please don't hesitate to call.      NEWTON MCCLELLAN RN    "

## 2025-02-09 NOTE — FIRST PROVIDER EVALUATION
Medical screening examination initiated.  I have conducted a focused provider triage encounter, findings are as follows:    Brief history of present illness:  chest pain  Onset last week  Admitted on Wednesday.  Had elevated liver.   Chest pain radiating to back     There were no vitals filed for this visit.    Pertinent physical exam:  Hrr Lungs clear to ascultation    Brief workup plan:  chest pain work up     Preliminary workup initiated; this workup will be continued and followed by the physician or advanced practice provider that is assigned to the patient when roomed.

## 2025-02-10 LAB
BACTERIA UR CULT: NORMAL
BACTERIA UR CULT: NORMAL

## 2025-02-11 ENCOUNTER — TELEPHONE (OUTPATIENT)
Dept: CARDIOLOGY | Facility: HOSPITAL | Age: 50
End: 2025-02-11

## 2025-02-12 ENCOUNTER — CLINICAL SUPPORT (OUTPATIENT)
Dept: CARDIOLOGY | Facility: HOSPITAL | Age: 50
End: 2025-02-12
Attending: EMERGENCY MEDICINE
Payer: COMMERCIAL

## 2025-02-12 ENCOUNTER — HOSPITAL ENCOUNTER (OUTPATIENT)
Dept: RADIOLOGY | Facility: HOSPITAL | Age: 50
Discharge: HOME | End: 2025-02-12
Attending: EMERGENCY MEDICINE
Payer: COMMERCIAL

## 2025-02-12 DIAGNOSIS — R07.9 CHEST PAIN: ICD-10-CM

## 2025-02-12 LAB
CV STRESS BASE HR: 63 BPM
DIASTOLIC BLOOD PRESSURE: 84 MMHG
OHS CV CPX 1 MINUTE RECOVERY HEART RATE: 139 BPM
OHS CV CPX 85 PERCENT MAX PREDICTED HEART RATE MALE: 145
OHS CV CPX ESTIMATED METS: 7.1
OHS CV CPX MAX PREDICTED HEART RATE: 171
OHS CV CPX PATIENT IS FEMALE: 1
OHS CV CPX PATIENT IS MALE: 0
OHS CV CPX PEAK DIASTOLIC BLOOD PRESSURE: 92 MMHG
OHS CV CPX PEAK HEAR RATE: 147 BPM
OHS CV CPX PEAK RATE PRESSURE PRODUCT: NORMAL
OHS CV CPX PEAK SYSTOLIC BLOOD PRESSURE: 200 MMHG
OHS CV CPX PERCENT MAX PREDICTED HEART RATE ACHIEVED: 90
OHS CV CPX RATE PRESSURE PRODUCT PRESENTING: 8190
STRESS ECHO POST EXERCISE DUR MIN: 6 MINUTES
SYSTOLIC BLOOD PRESSURE: 130 MMHG

## 2025-02-12 PROCEDURE — A9502 TC99M TETROFOSMIN: HCPCS | Performed by: EMERGENCY MEDICINE

## 2025-02-12 PROCEDURE — 78452 HT MUSCLE IMAGE SPECT MULT: CPT | Mod: 26,,, | Performed by: RADIOLOGY

## 2025-02-12 PROCEDURE — 78452 HT MUSCLE IMAGE SPECT MULT: CPT | Mod: TC

## 2025-02-12 PROCEDURE — 93017 CV STRESS TEST TRACING ONLY: CPT

## 2025-02-12 RX ADMIN — TETROFOSMIN 10 MILLICURIE: 1.38 INJECTION, POWDER, LYOPHILIZED, FOR SOLUTION INTRAVENOUS at 08:02

## 2025-02-12 RX ADMIN — TETROFOSMIN 27.4 MILLICURIE: 0.23 INJECTION, POWDER, LYOPHILIZED, FOR SOLUTION INTRAVENOUS at 09:02

## 2025-03-18 ENCOUNTER — OFFICE VISIT (OUTPATIENT)
Dept: CARDIOLOGY | Facility: CLINIC | Age: 50
End: 2025-03-18
Payer: COMMERCIAL

## 2025-03-18 VITALS
OXYGEN SATURATION: 100 % | HEIGHT: 63 IN | DIASTOLIC BLOOD PRESSURE: 66 MMHG | WEIGHT: 209.19 LBS | BODY MASS INDEX: 37.07 KG/M2 | HEART RATE: 66 BPM | SYSTOLIC BLOOD PRESSURE: 122 MMHG

## 2025-03-18 DIAGNOSIS — R07.9 CHEST PAIN, UNSPECIFIED TYPE: Primary | ICD-10-CM

## 2025-03-18 DIAGNOSIS — I10 ESSENTIAL HYPERTENSION: ICD-10-CM

## 2025-03-18 DIAGNOSIS — Z82.49 FAMILY HISTORY OF PREMATURE CORONARY ARTERY DISEASE: ICD-10-CM

## 2025-03-18 DIAGNOSIS — J98.11 ATELECTASIS: ICD-10-CM

## 2025-03-18 PROCEDURE — 1159F MED LIST DOCD IN RCRD: CPT | Mod: CPTII,S$GLB,, | Performed by: INTERNAL MEDICINE

## 2025-03-18 PROCEDURE — 99999 PR PBB SHADOW E&M-EST. PATIENT-LVL IV: CPT | Mod: PBBFAC,,, | Performed by: INTERNAL MEDICINE

## 2025-03-18 PROCEDURE — 99204 OFFICE O/P NEW MOD 45 MIN: CPT | Mod: S$GLB,,, | Performed by: INTERNAL MEDICINE

## 2025-03-18 PROCEDURE — 3078F DIAST BP <80 MM HG: CPT | Mod: CPTII,S$GLB,, | Performed by: INTERNAL MEDICINE

## 2025-03-18 PROCEDURE — 3008F BODY MASS INDEX DOCD: CPT | Mod: CPTII,S$GLB,, | Performed by: INTERNAL MEDICINE

## 2025-03-18 PROCEDURE — 3074F SYST BP LT 130 MM HG: CPT | Mod: CPTII,S$GLB,, | Performed by: INTERNAL MEDICINE

## 2025-03-18 NOTE — PROGRESS NOTES
[facility]    Subjective:     Patient ID:  Marcelina Deluca is a 49 y.o. female patient here for evaluation Chest Pain (Patient is having cheat pain. , on right side )      HPI:  49-year-old female here for evaluation of dyslipidemia.  Previously on rosuvastatin, held because of altered liver enzymes which improved after some GI management.  Reports her mother had a massive MI at 38 years of age.  Currently not taking a statin.  Had chest discomfort on the right side of the chest, stress test negative for ischemia, informed that she was told that she would get a pulmonology referral but has not gotten it.    Review of Systems   All other systems reviewed and are negative.       Past Medical History:   Diagnosis Date    Thyroid disease        Past Surgical History:   Procedure Laterality Date    ERCP N/A 2025    Procedure: ERCP (ENDOSCOPIC RETROGRADE CHOLANGIOPANCREATOGRAPHY);  Surgeon: Eliseo Martin III, MD;  Location: [facility];  Service: Endoscopy;  Laterality: N/A;       Family History   Problem Relation Name Age of Onset    Melanoma Neg Hx         Social History     Socioeconomic History    Marital status: Single   Tobacco Use    Smoking status: Former     Current packs/day: 0.00     Average packs/day: 1 pack/day for 28.0 years (28.0 ttl pk-yrs)     Types: Cigarettes, Vaping with nicotine     Start date:      Quit date:      Years since quittin.2    Smokeless tobacco: Never     Social Drivers of Health     Financial Resource Strain: Low Risk  (2025)    Overall Financial Resource Strain (CARDIA)     Difficulty of Paying Living Expenses: Not hard at all   Food Insecurity: No Food Insecurity (2025)    Hunger Vital Sign     Worried About Running Out of Food in the Last Year: Never true     Ran Out of Food in the Last Year: Never true   Transportation Needs: No Transportation Needs (2025)    TRANSPORTATION NEEDS      "Transportation : No   Physical Activity: Insufficiently Active (11/29/2024)    Exercise Vital Sign     Days of Exercise per Week: 4 days     Minutes of Exercise per Session: 30 min   Stress: No Stress Concern Present (2/6/2025)    [employer] [place] [employer] - Occupational Stress Questionnaire     Feeling of Stress : Only a little   Housing Stability: Unknown (2/6/2025)    Housing Stability Vital Sign     Unable to Pay for Housing in the Last Year: No     Homeless in the Last Year: No       Current Medications[1]    Review of patient's allergies indicates:   Allergen Reactions    Opioids - morphine analogues Palpitations and Other (See Comments)     Increased chest pain         Objective:        Vitals:    03/18/25 1525   BP: 122/66   Pulse: 66       Physical Exam  Vitals reviewed.   Constitutional:       Appearance: Normal appearance.   Cardiovascular:      Rate and Rhythm: Normal rate and regular rhythm.      Pulses: Normal pulses.      Heart sounds: Normal heart sounds. No murmur heard.     No gallop.   Pulmonary:      Effort: Pulmonary effort is normal.      Breath sounds: Normal breath sounds.   Skin:     General: Skin is warm.   Neurological:      General: No focal deficit present.      Mental Status: She is alert and oriented to person, place, and time.         LIPIDS - LAST 2   No results found for: "CHOL", "HDL", "LDLCALC", "TRIG", "CHOLHDL"    CBC - LAST 2  Lab Results   Component Value Date    WBC 5.40 02/09/2025    WBC 5.92 02/07/2025    RBC 4.82 02/09/2025    RBC 4.36 02/07/2025    HGB 13.7 02/09/2025    HGB 12.5 02/07/2025    HCT 42.4 02/09/2025    HCT 37.8 02/07/2025    MCV 88 02/09/2025    MCV 87 02/07/2025    MCH 28.4 02/09/2025    MCH 28.7 02/07/2025    MCHC 32.3 02/09/2025    MCHC 33.1 02/07/2025    RDW 12.7 02/09/2025    RDW 12.4 02/07/2025     02/09/2025     02/07/2025    MPV 10.2 02/09/2025    MPV 10.7 02/07/2025    GRAN 3.4 02/09/2025    GRAN 63.7 02/09/2025    LYMPH " 1.5 02/09/2025    LYMPH 27.0 02/09/2025    MONO 0.4 02/09/2025    MONO 6.9 02/09/2025    BASO 0.04 02/09/2025    BASO 0.01 02/07/2025    NRBC 0 02/09/2025    NRBC 0 02/07/2025       CHEMISTRY & LIVER FUNCTION - LAST 2  Lab Results   Component Value Date     02/09/2025     02/07/2025    K 3.5 02/09/2025    K 3.9 02/07/2025     02/09/2025     02/07/2025    CO2 25 02/09/2025    CO2 26 02/07/2025    ANIONGAP 12 02/09/2025    ANIONGAP 4 (L) 02/07/2025    BUN 13 02/09/2025    BUN 12 02/07/2025    CREATININE 0.9 02/09/2025    CREATININE 0.9 02/07/2025     (H) 02/09/2025     (H) 02/07/2025    CALCIUM 8.8 02/09/2025    CALCIUM 8.4 (L) 02/07/2025    PH 7.432 02/09/2025    MG 2.1 02/09/2025    MG 2.2 02/07/2025    ALBUMIN 4.0 02/09/2025    ALBUMIN 3.9 02/07/2025    PROT 6.5 02/09/2025    PROT 6.3 02/07/2025    ALKPHOS 133 02/09/2025    ALKPHOS 177 (H) 02/07/2025    ALT 42 02/09/2025     (H) 02/07/2025    AST 19 02/09/2025    AST 71 (H) 02/07/2025    BILITOT 0.3 02/09/2025    BILITOT 0.4 02/07/2025        CARDIAC PROFILE - LAST 2  Lab Results   Component Value Date    BNP 19 02/09/2025    CPK 42 02/09/2025        COAGULATION - LAST 2  Lab Results   Component Value Date    INR 1.0 02/06/2025       ENDOCRINE & PSA - LAST 2  Lab Results   Component Value Date    TSH 8.978 (H) 02/09/2025    TSH 6.960 (H) 02/06/2025        ECHOCARDIOGRAM RESULTS  No results found for this or any previous visit.      CURRENT/PREVIOUS VISIT EKG  Results for orders placed or performed during the hospital encounter of 02/09/25   EKG 12-lead    Collection Time: 02/09/25  9:46 AM   Result Value Ref Range    QRS Duration 68 ms    OHS QTC Calculation 407 ms    Narrative    Test Reason : R07.9,    Vent. Rate :  65 BPM     Atrial Rate :  65 BPM     P-R Int : 142 ms          QRS Dur :  68 ms      QT Int : 392 ms       P-R-T Axes :  69  61  61 degrees    QTcB Int : 407 ms    Normal sinus rhythm with sinus  arrhythmia  Low voltage QRS  Borderline Abnormal ECG  No previous ECGs available  Confirmed by Jack Coombs ([id number]) on 2/9/2025 1:28:47 PM    Referred By:            Confirmed By: Jack Coombs     No valid procedures specified.   Results for orders placed in visit on 02/12/25    Nuclear Stress Test    Interpretation Summary    The ECG portion of the study is negative for ischemia.    The patient reported no chest pain during the stress test.    There were no arrhythmias during stress.    The nuclear portion of this study will be reported separately. The patient exercised for 6 minutes  seconds on a Emmett protocol, achieving a peak heart rate of 147 bpm, which is 90% of the age predicted maximum heart rate.    No valid procedures specified.        Assessment:       1. Chest pain, unspecified type    2. Atelectasis    3. Essential hypertension    4. Family history of premature coronary artery disease           Plan:       Chest pain, unspecified type  -     Ambulatory referral/consult to Cardiology  -     IN OFFICE EKG 12-LEAD (to [place])  -     Cancel: Ambulatory referral/consult to Pulmonology; Future; Expected date: 03/25/2025  -     Ambulatory referral/consult to Pulmonology; Future; Expected date: 03/25/2025    Atelectasis  -     Cancel: Ambulatory referral/consult to Pulmonology; Future; Expected date: 03/25/2025  -     Ambulatory referral/consult to Pulmonology; Future; Expected date: 03/25/2025    Essential hypertension    Family history of premature coronary artery disease  -     Apolipoprotein B; Future; Expected date: 03/18/2025  -     LIPOPROTEIN A (LPA); Future; Expected date: 03/18/2025  -     Lipid Panel; Future; Expected date: 03/18/2025    Unsure of the etiology of chest pain, does not appear to be cardiac in etiology.  Will send the patient to pulmonology at the patient's request all the do not think it is pulmonology she related either.  Patient does report she might have some reflux, informed  her to consider some over-the-counter gerd medications like Protonix, she has a follow-up with GI.    Given family history of premature CAD, will evaluate further with ApoB and LPA levels and if elevated, if cleared by GI, will resume statin.  Currently her ASCVD risk score is less than 5% and there is no indication for statin, she does have some xanthelasmas.  Continue with hydrochlorothiazide amlodipine for hypertension.    Follow up in about 3 months (around 6/18/2025) for f/u Apo B, LPA.          Polo Santizo MD  [facility]         [1]   Current Outpatient Medications   Medication Sig Dispense Refill    amLODIPine (NORVASC) 5 MG tablet Take 5 mg by mouth once daily.      ferrous sulfate (FEOSOL) Tab tablet Take 1 tablet by mouth daily with breakfast.      hydroCHLOROthiazide (MICROZIDE) 12.5 mg capsule Take 12.5 mg by mouth once daily.      iron,carb/vit C/vit B12/folic (IRON 100 PLUS ORAL) Take by mouth.      levothyroxine (SYNTHROID) 125 MCG tablet Take 1 tablet (125 mcg total) by mouth every morning. 30 tablet 0    solifenacin (VESICARE) 10 MG tablet Take 1 tablet (10 mg total) by mouth once daily. 30 tablet 11    traMADoL (ULTRAM) 50 mg tablet Take 50 mg by mouth every 6 (six) hours as needed. (Patient not taking: Reported on 2/6/2025)       Current Facility-Administered Medications   Medication Dose Route Frequency Provider Last Rate Last Admin    triamcinolone acetonide injection 40 mg  40 mg Intramuscular 1 time in Clinic/HOD Faith Sharif MD

## 2025-03-19 LAB
OHS QRS DURATION: 76 MS
OHS QTC CALCULATION: 397 MS

## 2025-03-25 DIAGNOSIS — R06.09 DOE (DYSPNEA ON EXERTION): Primary | ICD-10-CM

## 2025-03-28 ENCOUNTER — OFFICE VISIT (OUTPATIENT)
Dept: PULMONOLOGY | Facility: CLINIC | Age: 50
End: 2025-03-28
Payer: COMMERCIAL

## 2025-03-28 VITALS
BODY MASS INDEX: 36.67 KG/M2 | DIASTOLIC BLOOD PRESSURE: 84 MMHG | SYSTOLIC BLOOD PRESSURE: 131 MMHG | WEIGHT: 207 LBS | OXYGEN SATURATION: 99 % | HEART RATE: 57 BPM

## 2025-03-28 DIAGNOSIS — K44.9 HIATAL HERNIA: ICD-10-CM

## 2025-03-28 DIAGNOSIS — R07.9 CHEST PAIN, UNSPECIFIED TYPE: ICD-10-CM

## 2025-03-28 DIAGNOSIS — Z87.891 PERSONAL HISTORY OF NICOTINE DEPENDENCE: ICD-10-CM

## 2025-03-28 DIAGNOSIS — R06.09 DOE (DYSPNEA ON EXERTION): Primary | ICD-10-CM

## 2025-03-28 PROCEDURE — 99999 PR PBB SHADOW E&M-EST. PATIENT-LVL IV: CPT | Mod: PBBFAC,,,

## 2025-03-28 RX ORDER — PANTOPRAZOLE SODIUM 40 MG/1
40 TABLET, DELAYED RELEASE ORAL DAILY
Qty: 90 TABLET | Refills: 3 | Status: SHIPPED | OUTPATIENT
Start: 2025-03-28 | End: 2026-03-28

## 2025-03-28 NOTE — PROGRESS NOTES
History and Physical Note  [facility]    Subjective:     Reason for visit: Chest pain    Patient ID:  Marcelina Deluca is a 49 y.o. female    Interval History 2025:  Patient is here with her sister that provides additional history. She is here for evaluation of chest pain. She has episodes of right upper sharp chest pain. She denies shortness of breath or cough associated with this problem.She was initially evaluated in the ED on 2025 for this problem. Chest pain has been constant since that visit. It has stopped radiating towards her back but will have episodes of worsening pain. This has been interfering with daily living. Pain is causing night time disturbances.   She was seen by cardiology with negative workup for chest pain.   Small hiatal hernia on ct chest. She is not currently taking any PPI's.She is set to be seen by gastro this week. Elevated LFT's while in patient. Cardiology will be managing her statins.  Compression socks to help with leg swelling.     Additional Pulmonary History:  Occupational: EMT;   Exposure to Animals/Pets: 1 dog  History of exposures to TB: negative quantiferon gold  Family History of Lung Cancer: none; grandfather had a fibrotic lung dsiease  Childhood Illnesses:  none  Tobacco/Smokin total pack years; no longer vaping for the last year;    Tobacco Use History[1]    Objective:     Vitals:    25 0954   BP: 131/84   BP Location: Left forearm   Patient Position: Sitting   Pulse: (!) 57   SpO2: 99%   Weight: 93.9 kg (207 lb 0.2 oz)         Physical Exam  Constitutional:       Appearance: Normal appearance. She is well-developed.   HENT:      Head: Normocephalic.   Cardiovascular:      Rate and Rhythm: Normal rate.      Pulses: Normal pulses.      Heart sounds: Normal heart sounds, S1 normal and S2 normal.   Pulmonary:      Effort: Pulmonary effort is normal.      Breath sounds: Normal breath sounds. No decreased breath sounds.   Musculoskeletal:      Right  lower leg: No edema.      Left lower leg: No edema.   Skin:     General: Skin is warm.      Capillary Refill: Capillary refill takes less than 2 seconds.      Findings: No rash.      Nails: There is no clubbing.   Neurological:      Mental Status: She is alert and oriented to person, place, and time. Mental status is at baseline.   Psychiatric:         Attention and Perception: Attention normal.         Mood and Affect: Mood and affect normal.         Speech: Speech normal.         Behavior: Behavior is cooperative.          Personal Diagnostic Review and Interpretation  CT chest 02/2025  No emphysema   No high risk nodules   Small hiatal hernia     Pertinent Studies Reviewed & Interpreted:     Pulmonary Function Tests:   pending    Echocardiograms:   Stress test   02/2025    The ECG portion of the study is negative for ischemia.    The patient reported no chest pain during the stress test.    There were no arrhythmias during stress.    The nuclear portion of this study will be reported separately. The patient exercised for 6 minutes  seconds on a Emmett protocol, achieving a peak heart rate of 147 bpm, which is 90% of the age predicted maximum heart rate.    Other Pertinent Laboratories:  Lab Results   Component Value Date    WBC 5.40 02/09/2025    RBC 4.82 02/09/2025    HGB 13.7 02/09/2025    MCV 88 02/09/2025    MCH 28.4 02/09/2025    MCHC 32.3 02/09/2025    RDW 12.7 02/09/2025     02/09/2025    MPV 10.2 02/09/2025    GRAN 3.4 02/09/2025    GRAN 63.7 02/09/2025    LYMPH 1.5 02/09/2025    LYMPH 27.0 02/09/2025    MONO 0.4 02/09/2025    MONO 6.9 02/09/2025    EOS 0.1 02/09/2025    BASO 0.04 02/09/2025      Latest Reference Range & Units 02/05/25 21:39 02/06/25 03:08 02/07/25 05:02 02/09/25 11:32   Eos # 0.0 - 0.5 K/uL 0.0 0.1 0.0 0.1       Assessment & Plan:       Plan:  Clinical impression is resonably certain and repeated evaluation prn +/- follow up will be needed as below.      1. LAMBERT (dyspnea on  exertion)  -     Complete PFT with bronchodilator; Future    2. Chest pain, unspecified type  -     Ambulatory referral/consult to Pulmonology    3. Hiatal hernia  -     pantoprazole (PROTONIX) 40 MG tablet; Take 1 tablet (40 mg total) by mouth once daily.  Dispense: 90 tablet; Refill: 3    4. Personal history of nicotine dependence  Overview:  28 total pack years   Quit 14 months ago  Currently a non-smoker and remains highly motivated to continue cessation  We discussed current recommendations to start LDCT for annual lung cancer screening at the age of 50.           MDM:   The patient has a 28-pack-year smoking history and presents with persistent right upper chest pain, which has been evaluated by cardiology with a negative workup, and symptoms are suspected to be related to GERD given findings of a small hiatal hernia. A pulmonary function test has been ordered to assess for obstructive lung disease related to prior tobacco use. She was encouraged to continue smoking cessation and maintain GERD precautions. Continue to follow up with GI. Low-dose CT lung cancer screening was discussed, with plans to initiate screening at age 50 in February 2026. PFTs will guide inhaler use. Return to clinic in 1 month to ensure improvement.   RETURN TO CLINIC IN 1 MONTHS     Total professional time spent for the encounter: 60 minutes  Time was spent preparing to see the patient, reviewing results of prior testing, obtaining and/or reviewing separately obtained history, performing a medically appropriate examination and interview, counseling and educating the patient/family, ordering medications/tests/procedures, referring and communicating with other health care professionals, documenting clinical information in the electronic health record, and independently interpreting results.    Sharmaine Wilcox DNP         [1]   Social History  Tobacco Use   Smoking Status Former    Current packs/day: 0.00    Average packs/day: 1 pack/day  for 28.0 years (28.0 ttl pk-yrs)    Types: Cigarettes, Vaping with nicotine    Start date:     Quit date:     Years since quittin.2   Smokeless Tobacco Never

## 2025-04-25 ENCOUNTER — LAB VISIT (OUTPATIENT)
Dept: LAB | Facility: HOSPITAL | Age: 50
End: 2025-04-25
Attending: INTERNAL MEDICINE
Payer: COMMERCIAL

## 2025-04-25 DIAGNOSIS — R74.01 TRANSAMINITIS: ICD-10-CM

## 2025-04-25 DIAGNOSIS — Z82.49 FAMILY HISTORY OF PREMATURE CORONARY ARTERY DISEASE: ICD-10-CM

## 2025-04-25 LAB
ALBUMIN SERPL-MCNC: 4.6 G/DL (ref 3.5–5.2)
ALP SERPL-CCNC: 75 UNIT/L (ref 55–135)
ALT SERPL-CCNC: 7 UNIT/L (ref 10–44)
ANION GAP (SMH): 9 MMOL/L (ref 8–16)
AST SERPL-CCNC: 12 UNIT/L (ref 10–40)
BILIRUB SERPL-MCNC: 0.6 MG/DL (ref 0.1–1)
BUN SERPL-MCNC: 16 MG/DL (ref 6–20)
CALCIUM SERPL-MCNC: 9.6 MG/DL (ref 8.7–10.5)
CHLORIDE SERPL-SCNC: 102 MMOL/L (ref 95–110)
CHOLEST SERPL-MCNC: 233 MG/DL (ref 120–199)
CHOLEST/HDLC SERPL: 4.9 {RATIO} (ref 2–5)
CO2 SERPL-SCNC: 28 MMOL/L (ref 23–29)
CREAT SERPL-MCNC: 0.9 MG/DL (ref 0.5–1.4)
GFR SERPLBLD CREATININE-BSD FMLA CKD-EPI: >60 ML/MIN/1.73/M2
GLUCOSE SERPL-MCNC: 97 MG/DL (ref 70–110)
HDLC SERPL-MCNC: 48 MG/DL (ref 40–75)
HDLC SERPL: 20.6 % (ref 20–50)
LDLC SERPL CALC-MCNC: 161.4 MG/DL (ref 63–159)
NONHDLC SERPL-MCNC: 185 MG/DL
POTASSIUM SERPL-SCNC: 3.7 MMOL/L (ref 3.5–5.1)
PROT SERPL-MCNC: 7.4 GM/DL (ref 6–8.4)
SODIUM SERPL-SCNC: 139 MMOL/L (ref 136–145)
TRIGL SERPL-MCNC: 118 MG/DL (ref 30–150)

## 2025-04-25 PROCEDURE — 80051 ELECTROLYTE PANEL: CPT

## 2025-04-25 PROCEDURE — 84478 ASSAY OF TRIGLYCERIDES: CPT

## 2025-04-25 PROCEDURE — 83695 ASSAY OF LIPOPROTEIN(A): CPT

## 2025-04-25 PROCEDURE — 36415 COLL VENOUS BLD VENIPUNCTURE: CPT

## 2025-04-28 ENCOUNTER — LAB VISIT (OUTPATIENT)
Dept: LAB | Facility: HOSPITAL | Age: 50
End: 2025-04-28
Attending: INTERNAL MEDICINE
Payer: COMMERCIAL

## 2025-04-28 ENCOUNTER — HOSPITAL ENCOUNTER (OUTPATIENT)
Dept: PULMONOLOGY | Facility: HOSPITAL | Age: 50
Discharge: HOME | End: 2025-04-28
Payer: COMMERCIAL

## 2025-04-28 DIAGNOSIS — Z82.49 FAMILY HISTORY OF PREMATURE CORONARY ARTERY DISEASE: ICD-10-CM

## 2025-04-28 DIAGNOSIS — R06.09 DOE (DYSPNEA ON EXERTION): Primary | ICD-10-CM

## 2025-04-28 DIAGNOSIS — R06.09 DOE (DYSPNEA ON EXERTION): ICD-10-CM

## 2025-04-28 DIAGNOSIS — R74.01 TRANSAMINITIS: Primary | ICD-10-CM

## 2025-04-28 DIAGNOSIS — R07.9 CHEST PAIN, UNSPECIFIED TYPE: ICD-10-CM

## 2025-04-28 PROCEDURE — 94729 DIFFUSING CAPACITY: CPT

## 2025-04-28 PROCEDURE — 83695 ASSAY OF LIPOPROTEIN(A): CPT

## 2025-04-28 PROCEDURE — 36415 COLL VENOUS BLD VENIPUNCTURE: CPT

## 2025-04-28 PROCEDURE — 82172 ASSAY OF APOLIPOPROTEIN: CPT

## 2025-04-28 PROCEDURE — 94726 PLETHYSMOGRAPHY LUNG VOLUMES: CPT

## 2025-04-28 PROCEDURE — 94060 EVALUATION OF WHEEZING: CPT

## 2025-04-28 RX ORDER — ALBUTEROL SULFATE 2.5 MG/.5ML
SOLUTION RESPIRATORY (INHALATION)
Status: DISPENSED
Start: 2025-04-28 | End: 2025-04-28

## 2025-04-28 NOTE — RESPIRATORY THERAPY
Patient arrived for PFT study, procedure explained to patient.  Patient refused to use nose clip for test.  Stated that should have been  explained to her before scheduling test. Refused to do test and walked out upset.

## 2025-04-30 LAB
APO B SERPL-MCNC: 122 MG/DL
LPA SERPL-SCNC: 186 NMOL/L

## 2025-05-07 ENCOUNTER — TELEPHONE (OUTPATIENT)
Dept: CARDIOLOGY | Facility: CLINIC | Age: 50
End: 2025-05-07
Payer: COMMERCIAL

## 2025-05-07 NOTE — TELEPHONE ENCOUNTER
----- Message from Karma sent at 5/7/2025 10:05 AM CDT -----  Regarding: advice  Type:  Needs Medical AdviceWho Called: Sherley Call Back Number: [phone] Information: pt wants a callback to discuss a med change.  please call to discuss.

## 2025-05-12 ENCOUNTER — TELEPHONE (OUTPATIENT)
Dept: CARDIOLOGY | Facility: CLINIC | Age: 50
End: 2025-05-12
Payer: COMMERCIAL

## 2025-05-12 DIAGNOSIS — E78.5 DYSLIPIDEMIA: Primary | ICD-10-CM

## 2025-05-12 RX ORDER — ATORVASTATIN CALCIUM 20 MG/1
20 TABLET, FILM COATED ORAL DAILY
Qty: 90 TABLET | Refills: 3 | Status: SHIPPED | OUTPATIENT
Start: 2025-05-12 | End: 2026-05-12

## 2025-05-12 NOTE — TELEPHONE ENCOUNTER
----- Message from Edilberto Hall sent at 5/7/2025  4:13 PM CDT -----  Hello . Please review patient's lipid profile . She is saying , that you stopped some of the medications. Thank you  ----- Message -----  From: Karma Bañuelos  Sent: 5/7/2025   3:17 PM CDT  To: Sukhdev Cuellar Staff    Type:  Patient Returning CallWho Called:ptWho Left Message for Patient:Renuka Narvaez the patient know what this is regarding?:yesBest Call Back Number:[phone] Information:

## 2025-05-12 NOTE — TELEPHONE ENCOUNTER
Please inform patient that based on cholesterol studies, I would recommend that she start taking a cholesterol medication again. This time we will try atorvastatin 20 mg every day. We will need to keep a close eye on her liver enzymes as she had reaction last time on rosuvastatin. I would like her to start taking this medication 1 tablet every day and check her liver enzymes in about 2 weeks from the time she starts taking this medication. I have ordered the blood work for liver enzymes. It is not fasting blood work. Please reach out to me once you do your blood work for your liver enzymes and I will give further recommendations based on that.

## 2025-08-02 ENCOUNTER — OFFICE VISIT (OUTPATIENT)
Dept: URGENT CARE | Facility: CLINIC | Age: 50
End: 2025-08-02
Payer: COMMERCIAL

## 2025-08-02 VITALS
SYSTOLIC BLOOD PRESSURE: 127 MMHG | HEART RATE: 63 BPM | HEIGHT: 63 IN | RESPIRATION RATE: 17 BRPM | OXYGEN SATURATION: 97 % | DIASTOLIC BLOOD PRESSURE: 86 MMHG | TEMPERATURE: 98 F | WEIGHT: 207 LBS | BODY MASS INDEX: 36.68 KG/M2

## 2025-08-02 DIAGNOSIS — U07.1 COVID: Primary | ICD-10-CM

## 2025-08-02 DIAGNOSIS — R05.9 COUGH, UNSPECIFIED TYPE: ICD-10-CM

## 2025-08-02 DIAGNOSIS — U07.1 COVID-19 VIRUS DETECTED: ICD-10-CM

## 2025-08-02 LAB
CTP QC/QA: YES
CTP QC/QA: YES
FLUAV AG NPH QL: NEGATIVE
FLUBV AG NPH QL: NEGATIVE
SARS-COV+SARS-COV-2 AG RESP QL IA.RAPID: POSITIVE

## 2025-08-02 PROCEDURE — 87811 SARS-COV-2 COVID19 W/OPTIC: CPT | Mod: QW,S$GLB,, | Performed by: NURSE PRACTITIONER

## 2025-08-02 PROCEDURE — 99214 OFFICE O/P EST MOD 30 MIN: CPT | Mod: S$GLB,,, | Performed by: NURSE PRACTITIONER

## 2025-08-02 PROCEDURE — 87804 INFLUENZA ASSAY W/OPTIC: CPT | Mod: QW,,, | Performed by: NURSE PRACTITIONER

## 2025-08-02 NOTE — PATIENT INSTRUCTIONS
PLEASE HOLD YOUR CHOLESTEROL STATIN MEDICATION WHILE TAKING PAXLOVID  You are positive for flu/covid    Stay at home for 2-3 days until your symptoms start to improve    Drink lots of fluids and rest, but walk around every couple of hours    Can treat fever/headache/bodyaches with over-the-counter Tylenol and Advil, follow package instructions    Can use over-the-counter cough and cold medication such as DayQuil, NyQuil, or Robitussin CF as needed for your cough and cold symptoms, follow package instructions    Go to the emergency room for any breathing difficulty or weakness

## 2025-08-02 NOTE — LETTER
August 2, 2025      [facility]  [address]  [address]  [place] [address]  Phone: [phone]  Fax: [phone]       Patient: Marcelina Deluca   YOB: 1975  Date of Visit: 08/02/2025    To Whom It May Concern:    Juanis Deluca  was at [facility] on 08/02/2025. The patient may return to work/school on 08/05/2025 with no restrictions. If you have any questions or concerns, or if I can be of further assistance, please do not hesitate to contact me.    Sincerely,    Bay Ball, DNP

## 2025-08-02 NOTE — PROGRESS NOTES
"Subjective:      Patient ID: Marcelina Deluca is a 49 y.o. female.    Vitals:  height is 5' 3" (1.6 m) and weight is 93.9 kg (207 lb). Her oral temperature is 97.9 °F (36.6 °C). Her blood pressure is 127/86 and her pulse is 63. Her respiration is 17 and oxygen saturation is 97%.     Chief Complaint: Cough    Cough  This is a new problem. The current episode started yesterday. The problem has been unchanged. The problem occurs every few minutes. The cough is Non-productive. Associated symptoms include headaches, postnasal drip and a sore throat. Pertinent negatives include no chest pain, chills, ear pain, fever, myalgias, nasal congestion, rash, shortness of breath or wheezing. The symptoms are aggravated by lying down. Treatments tried: day and night quil. The treatment provided no relief.       Constitution: Negative for chills, sweating, fatigue and fever.   HENT:  Positive for congestion, postnasal drip and sore throat. Negative for ear pain, ear discharge, sinus pain, trouble swallowing and voice change.    Neck: Negative for neck pain and neck stiffness.   Cardiovascular:  Negative for chest pain and palpitations.   Eyes: Negative.    Respiratory:  Positive for cough. Negative for sputum production, shortness of breath and wheezing.    Gastrointestinal:  Negative for abdominal pain, nausea, vomiting and diarrhea.   Endocrine: negative.   Genitourinary: Negative.    Musculoskeletal:  Negative for muscle ache.   Skin:  Negative for rash.   Allergic/Immunologic: Negative.    Neurological:  Positive for headaches. Negative for dizziness.   Hematologic/Lymphatic: Negative.       Objective:     Physical Exam   Constitutional: She is oriented to person, place, and time. She appears well-developed. She is cooperative.  Non-toxic appearance. She does not appear ill. No distress.   HENT:   Head: Normocephalic and atraumatic.   Ears:   Right Ear: Hearing, tympanic membrane, external ear and ear canal normal.   Left Ear: " Hearing, tympanic membrane, external ear and ear canal normal.   Nose: Rhinorrhea and congestion present. No mucosal edema or nasal deformity. No epistaxis. Right sinus exhibits no maxillary sinus tenderness and no frontal sinus tenderness. Left sinus exhibits no maxillary sinus tenderness and no frontal sinus tenderness.   Mouth/Throat: Uvula is midline, oropharynx is clear and moist and mucous membranes are normal. Mucous membranes are moist. No trismus in the jaw. Normal dentition. No uvula swelling. No oropharyngeal exudate, posterior oropharyngeal edema or posterior oropharyngeal erythema. Oropharynx is clear.   Eyes: Conjunctivae and lids are normal. Pupils are equal, round, and reactive to light. No scleral icterus. Extraocular movement intact   Neck: Trachea normal and phonation normal. Neck supple. No edema present. No erythema present. No neck rigidity present.   Cardiovascular: Normal rate, regular rhythm, normal heart sounds and normal pulses.   Pulmonary/Chest: Effort normal. No stridor. No respiratory distress. She has no decreased breath sounds. She has no wheezes. She has no rhonchi. She has no rales. She exhibits no tenderness.   Abdominal: Normal appearance and bowel sounds are normal. She exhibits no distension. Soft.   Musculoskeletal: Normal range of motion.         General: No deformity. Normal range of motion.   Neurological: no focal deficit. She is alert and oriented to person, place, and time. She exhibits normal muscle tone. Coordination normal.   Skin: Skin is warm, dry, intact, not diaphoretic and not pale.   Psychiatric: Her speech is normal and behavior is normal. Judgment and thought content normal.   Nursing note and vitals reviewed.      Assessment:     1. COVID    2. Cough, unspecified type        Plan:       COVID  -     nirmatrelvir-ritonavir 300 mg (150 mg x 2)-100 mg copackaged tablets (EUA); Take 3 tablets by mouth 2 (two) times daily for 5 days. Each dose contains 2  nirmatrelvir (pink tablets) and 1 ritonavir (white tablet). Take all 3 tablets together  Dispense: 30 tablet; Refill: 0    Cough, unspecified type  -     SARS Coronavirus 2 Antigen, POCT Manual Read  -     POCT Influenza A/B Rapid Antigen        PLEASE HOLD YOUR CHOLESTEROL STATIN MEDICATION WHILE TAKING PAXLOVID  You are positive for flu/covid    Stay at home for 2-3 days until your symptoms start to improve    Drink lots of fluids and rest, but walk around every couple of hours    Can treat fever/headache/bodyaches with over-the-counter Tylenol and Advil, follow package instructions    Can use over-the-counter cough and cold medication such as DayQuil, NyQuil, or Robitussin CF as needed for your cough and cold symptoms, follow package instructions    Go to the emergency room for any breathing difficulty or weakness

## 2025-08-30 ENCOUNTER — OFFICE VISIT (OUTPATIENT)
Dept: URGENT CARE | Facility: CLINIC | Age: 50
End: 2025-08-30
Payer: COMMERCIAL

## 2025-08-31 ENCOUNTER — TELEPHONE (OUTPATIENT)
Dept: URGENT CARE | Facility: CLINIC | Age: 50
End: 2025-08-31
Payer: COMMERCIAL

## 2025-09-03 ENCOUNTER — OFFICE VISIT (OUTPATIENT)
Dept: OTOLARYNGOLOGY | Facility: CLINIC | Age: 50
End: 2025-09-03
Payer: COMMERCIAL

## 2025-09-03 VITALS — WEIGHT: 205.69 LBS | HEIGHT: 63 IN | BODY MASS INDEX: 36.45 KG/M2

## 2025-09-03 DIAGNOSIS — K08.89 PAIN, DENTAL: Primary | ICD-10-CM

## 2025-09-03 DIAGNOSIS — M26.629 TMJPDS (TEMPOROMANDIBULAR JOINT PAIN DYSFUNCTION SYNDROME): ICD-10-CM

## 2025-09-03 DIAGNOSIS — H92.01 REFERRED OTALGIA, RIGHT: ICD-10-CM

## 2025-09-03 PROCEDURE — 1159F MED LIST DOCD IN RCRD: CPT | Mod: CPTII,S$GLB,, | Performed by: OTOLARYNGOLOGY

## 2025-09-03 PROCEDURE — 1160F RVW MEDS BY RX/DR IN RCRD: CPT | Mod: CPTII,S$GLB,, | Performed by: OTOLARYNGOLOGY

## 2025-09-03 PROCEDURE — 99204 OFFICE O/P NEW MOD 45 MIN: CPT | Mod: S$GLB,,, | Performed by: OTOLARYNGOLOGY

## 2025-09-03 PROCEDURE — 99999 PR PBB SHADOW E&M-EST. PATIENT-LVL III: CPT | Mod: PBBFAC,,, | Performed by: OTOLARYNGOLOGY

## 2025-09-03 PROCEDURE — 3008F BODY MASS INDEX DOCD: CPT | Mod: CPTII,S$GLB,, | Performed by: OTOLARYNGOLOGY

## 2025-09-03 RX ORDER — ROSUVASTATIN CALCIUM 10 MG/1
10 TABLET, COATED ORAL
COMMUNITY
Start: 2025-07-28

## 2025-09-03 RX ORDER — CLINDAMYCIN HYDROCHLORIDE 300 MG/1
300 CAPSULE ORAL EVERY 6 HOURS
Qty: 40 CAPSULE | Refills: 0 | Status: SHIPPED | OUTPATIENT
Start: 2025-09-03 | End: 2025-09-13

## 2025-09-03 RX ORDER — PENICILLIN V POTASSIUM 500 MG/1
TABLET, FILM COATED ORAL
COMMUNITY
Start: 2025-08-15

## 2025-09-03 RX ORDER — METHYLPHENIDATE HYDROCHLORIDE 18 MG/1
18 TABLET ORAL
COMMUNITY
Start: 2025-09-02 | End: 2025-10-02

## 2025-09-03 RX ORDER — METHYLPREDNISOLONE 4 MG/1
TABLET ORAL
Qty: 21 EACH | Refills: 0 | Status: SHIPPED | OUTPATIENT
Start: 2025-09-03